# Patient Record
Sex: FEMALE | Race: WHITE | NOT HISPANIC OR LATINO | Employment: OTHER | URBAN - METROPOLITAN AREA
[De-identification: names, ages, dates, MRNs, and addresses within clinical notes are randomized per-mention and may not be internally consistent; named-entity substitution may affect disease eponyms.]

---

## 2017-12-28 PROBLEM — E86.0 DEHYDRATION: Status: ACTIVE | Noted: 2017-12-28

## 2017-12-29 ENCOUNTER — HOSPITAL ENCOUNTER (INPATIENT)
Facility: HOSPITAL | Age: 67
LOS: 5 days | Discharge: HOME | DRG: 871 | End: 2018-01-03
Attending: FAMILY MEDICINE | Admitting: FAMILY MEDICINE
Payer: MEDICARE

## 2017-12-29 DIAGNOSIS — J44.9 CHRONIC OBSTRUCTIVE PULMONARY DISEASE, UNSPECIFIED COPD TYPE: ICD-10-CM

## 2017-12-29 DIAGNOSIS — N17.9 ACUTE RENAL FAILURE, UNSPECIFIED ACUTE RENAL FAILURE TYPE: Primary | ICD-10-CM

## 2017-12-29 DIAGNOSIS — A04.5 CAMPYLOBACTER DIARRHEA: ICD-10-CM

## 2017-12-29 DIAGNOSIS — I95.9 HYPOTENSION: ICD-10-CM

## 2017-12-29 DIAGNOSIS — E11.9 TYPE 2 DIABETES MELLITUS WITHOUT COMPLICATION, UNSPECIFIED LONG TERM INSULIN USE STATUS: ICD-10-CM

## 2017-12-29 DIAGNOSIS — R41.82 ALTERED MENTAL STATUS, UNSPECIFIED ALTERED MENTAL STATUS TYPE: ICD-10-CM

## 2017-12-29 DIAGNOSIS — R41.82 ALTERED MENTAL STATUS: ICD-10-CM

## 2017-12-29 DIAGNOSIS — D69.6 THROMBOCYTOPENIA: ICD-10-CM

## 2017-12-29 DIAGNOSIS — E87.20 METABOLIC ACIDOSIS: ICD-10-CM

## 2017-12-29 DIAGNOSIS — R65.21 SEPTIC SHOCK: ICD-10-CM

## 2017-12-29 DIAGNOSIS — A41.9 SEPSIS, DUE TO UNSPECIFIED ORGANISM: ICD-10-CM

## 2017-12-29 DIAGNOSIS — A41.9 SEPTIC SHOCK: ICD-10-CM

## 2017-12-29 DIAGNOSIS — N17.9 ARF (ACUTE RENAL FAILURE): ICD-10-CM

## 2017-12-29 DIAGNOSIS — I95.89 OTHER SPECIFIED HYPOTENSION: ICD-10-CM

## 2017-12-29 DIAGNOSIS — E86.0 DEHYDRATION: ICD-10-CM

## 2017-12-29 DIAGNOSIS — E78.49 OTHER HYPERLIPIDEMIA: ICD-10-CM

## 2017-12-29 PROBLEM — E78.5 HLD (HYPERLIPIDEMIA): Status: ACTIVE | Noted: 2017-12-29

## 2017-12-29 PROBLEM — I10 HTN (HYPERTENSION): Status: ACTIVE | Noted: 2017-12-29

## 2017-12-29 PROBLEM — I25.10 CAD (CORONARY ARTERY DISEASE): Status: ACTIVE | Noted: 2017-12-29

## 2017-12-29 LAB
ALBUMIN SERPL BCP-MCNC: 2 G/DL
ALBUMIN SERPL BCP-MCNC: 2 G/DL
ALLENS TEST: ABNORMAL
ALLENS TEST: ABNORMAL
ALP SERPL-CCNC: 90 U/L
ALT SERPL W/O P-5'-P-CCNC: 8 U/L
AMORPH CRY URNS QL MICRO: ABNORMAL
ANION GAP SERPL CALC-SCNC: 13 MMOL/L
ANION GAP SERPL CALC-SCNC: 14 MMOL/L
APTT BLDCRRT: 25.2 SEC
AST SERPL-CCNC: 10 U/L
BACTERIA #/AREA URNS HPF: ABNORMAL /HPF
BASOPHILS # BLD AUTO: 0.01 K/UL
BASOPHILS NFR BLD: 0.2 %
BILIRUB SERPL-MCNC: 0.4 MG/DL
BILIRUB UR QL STRIP: NEGATIVE
BUN SERPL-MCNC: 67 MG/DL
BUN SERPL-MCNC: 72 MG/DL
CALCIUM SERPL-MCNC: 6.4 MG/DL
CALCIUM SERPL-MCNC: 6.5 MG/DL
CALCIUM UR-MCNC: <1 MG/DL
CHLORIDE SERPL-SCNC: 109 MMOL/L
CHLORIDE SERPL-SCNC: 110 MMOL/L
CHLORIDE UR-SCNC: 46 MMOL/L
CK SERPL-CCNC: 305 U/L
CLARITY UR: CLEAR
CO2 SERPL-SCNC: 15 MMOL/L
CO2 SERPL-SCNC: 16 MMOL/L
COLOR UR: YELLOW
CREAT SERPL-MCNC: 3.8 MG/DL
CREAT SERPL-MCNC: 4.6 MG/DL
CREAT UR-MCNC: 39.2 MG/DL
CREAT UR-MCNC: 39.2 MG/DL
DELSYS: ABNORMAL
DELSYS: ABNORMAL
DIFFERENTIAL METHOD: ABNORMAL
EOSINOPHIL # BLD AUTO: 0.1 K/UL
EOSINOPHIL NFR BLD: 2 %
EOSINOPHIL URNS QL WRIGHT STN: NORMAL
ERYTHROCYTE [DISTWIDTH] IN BLOOD BY AUTOMATED COUNT: 15.6 %
EST. GFR  (AFRICAN AMERICAN): 10 ML/MIN/1.73 M^2
EST. GFR  (AFRICAN AMERICAN): 13 ML/MIN/1.73 M^2
EST. GFR  (NON AFRICAN AMERICAN): 11 ML/MIN/1.73 M^2
EST. GFR  (NON AFRICAN AMERICAN): 9 ML/MIN/1.73 M^2
ESTIMATED AVG GLUCOSE: 197 MG/DL
ESTIMATED PA SYSTOLIC PRESSURE: 51.03
FLOW: 2
GLUCOSE SERPL-MCNC: 328 MG/DL
GLUCOSE SERPL-MCNC: 607 MG/DL
GLUCOSE UR QL STRIP: ABNORMAL
GRAN CASTS #/AREA URNS LPF: 2 /LPF
HBA1C MFR BLD HPLC: 8.5 %
HCO3 UR-SCNC: 14.6 MMOL/L (ref 24–28)
HCO3 UR-SCNC: 16.2 MMOL/L (ref 24–28)
HCT VFR BLD AUTO: 28.1 %
HGB BLD-MCNC: 8.9 G/DL
HGB UR QL STRIP: ABNORMAL
HYALINE CASTS #/AREA URNS LPF: 2 /LPF
INR PPP: 1.1
KETONES UR QL STRIP: NEGATIVE
LACTATE SERPL-SCNC: 0.7 MMOL/L
LDH SERPL L TO P-CCNC: 207 U/L
LEUKOCYTE ESTERASE UR QL STRIP: NEGATIVE
LYMPHOCYTES # BLD AUTO: 1.2 K/UL
LYMPHOCYTES NFR BLD: 20 %
MAGNESIUM SERPL-MCNC: 1.3 MG/DL
MCH RBC QN AUTO: 31.3 PG
MCHC RBC AUTO-ENTMCNC: 31.7 G/DL
MCV RBC AUTO: 99 FL
MICROSCOPIC COMMENT: ABNORMAL
MODE: ABNORMAL
MONOCYTES # BLD AUTO: 0.6 K/UL
MONOCYTES NFR BLD: 10.5 %
NEUTROPHILS # BLD AUTO: 4.1 K/UL
NEUTROPHILS NFR BLD: 66.8 %
NITRITE UR QL STRIP: NEGATIVE
PATH REV BLD -IMP: NORMAL
PCO2 BLDA: 37.2 MMHG (ref 35–45)
PCO2 BLDA: 38 MMHG (ref 35–45)
PH SMN: 7.19 [PH] (ref 7.35–7.45)
PH SMN: 7.25 [PH] (ref 7.35–7.45)
PH UR STRIP: 5 [PH] (ref 5–8)
PHOSPHATE SERPL-MCNC: 4.3 MG/DL
PHOSPHATE SERPL-MCNC: 4.8 MG/DL
PLATELET # BLD AUTO: 103 K/UL
PMV BLD AUTO: 11.7 FL
PO2 BLDA: 111 MMHG (ref 80–100)
PO2 BLDA: 95 MMHG (ref 80–100)
POC BE: -11 MMOL/L
POC BE: -14 MMOL/L
POC SATURATED O2: 96 % (ref 95–100)
POC SATURATED O2: 97 % (ref 95–100)
POC TCO2: 16 MMOL/L (ref 23–27)
POC TCO2: 17 MMOL/L (ref 23–27)
POCT GLUCOSE: 259 MG/DL (ref 70–110)
POCT GLUCOSE: 357 MG/DL (ref 70–110)
POCT GLUCOSE: 411 MG/DL (ref 70–110)
POCT GLUCOSE: 432 MG/DL (ref 70–110)
POCT GLUCOSE: 448 MG/DL (ref 70–110)
POTASSIUM SERPL-SCNC: 5.1 MMOL/L
POTASSIUM SERPL-SCNC: 5.4 MMOL/L
PROCALCITONIN SERPL IA-MCNC: 1.85 NG/ML
PROT SERPL-MCNC: 6.1 G/DL
PROT UR QL STRIP: ABNORMAL
PROT UR-MCNC: 13 MG/DL
PROT/CREAT RATIO, UR: 0.33
PROTHROMBIN TIME: 11.2 SEC
RBC # BLD AUTO: 2.84 M/UL
RBC #/AREA URNS HPF: 12 /HPF (ref 0–4)
RETIRED EF AND QEF - SEE NOTES: 55 (ref 55–65)
SAMPLE: ABNORMAL
SAMPLE: ABNORMAL
SITE: ABNORMAL
SITE: ABNORMAL
SODIUM SERPL-SCNC: 138 MMOL/L
SODIUM SERPL-SCNC: 139 MMOL/L
SODIUM UR-SCNC: 46 MMOL/L
SP GR UR STRIP: 1.02 (ref 1–1.03)
SP02: 100
SQUAMOUS #/AREA URNS HPF: 5 /HPF
T4 FREE SERPL-MCNC: 0.83 NG/DL
TRICUSPID VALVE REGURGITATION: ABNORMAL
TSH SERPL DL<=0.005 MIU/L-ACNC: 0.25 UIU/ML
URN SPEC COLLECT METH UR: ABNORMAL
UROBILINOGEN UR STRIP-ACNC: NEGATIVE EU/DL
UUN UR-MCNC: 291 MG/DL
VANCOMYCIN SERPL-MCNC: <1.1 UG/ML
WBC # BLD AUTO: 6.09 K/UL
WBC #/AREA URNS HPF: 1 /HPF (ref 0–5)

## 2017-12-29 PROCEDURE — G8996 SWALLOW CURRENT STATUS: HCPCS | Mod: CN

## 2017-12-29 PROCEDURE — 97802 MEDICAL NUTRITION INDIV IN: CPT

## 2017-12-29 PROCEDURE — 63600175 PHARM REV CODE 636 W HCPCS: Performed by: FAMILY MEDICINE

## 2017-12-29 PROCEDURE — 20000000 HC ICU ROOM

## 2017-12-29 PROCEDURE — 83036 HEMOGLOBIN GLYCOSYLATED A1C: CPT

## 2017-12-29 PROCEDURE — 25000003 PHARM REV CODE 250: Performed by: FAMILY MEDICINE

## 2017-12-29 PROCEDURE — 87205 SMEAR GRAM STAIN: CPT

## 2017-12-29 PROCEDURE — 84300 ASSAY OF URINE SODIUM: CPT

## 2017-12-29 PROCEDURE — 80202 ASSAY OF VANCOMYCIN: CPT

## 2017-12-29 PROCEDURE — 36600 WITHDRAWAL OF ARTERIAL BLOOD: CPT

## 2017-12-29 PROCEDURE — 92610 EVALUATE SWALLOWING FUNCTION: CPT

## 2017-12-29 PROCEDURE — 84443 ASSAY THYROID STIM HORMONE: CPT

## 2017-12-29 PROCEDURE — 83605 ASSAY OF LACTIC ACID: CPT

## 2017-12-29 PROCEDURE — 80069 RENAL FUNCTION PANEL: CPT

## 2017-12-29 PROCEDURE — 84165 PROTEIN E-PHORESIS SERUM: CPT

## 2017-12-29 PROCEDURE — 84145 PROCALCITONIN (PCT): CPT

## 2017-12-29 PROCEDURE — 87086 URINE CULTURE/COLONY COUNT: CPT

## 2017-12-29 PROCEDURE — 84166 PROTEIN E-PHORESIS/URINE/CSF: CPT | Mod: 26,,, | Performed by: PATHOLOGY

## 2017-12-29 PROCEDURE — 94761 N-INVAS EAR/PLS OXIMETRY MLT: CPT

## 2017-12-29 PROCEDURE — 4A00X4Z MEASUREMENT OF CENTRAL NERVOUS ELECTRICAL ACTIVITY, EXTERNAL APPROACH: ICD-10-PCS | Performed by: PSYCHIATRY & NEUROLOGY

## 2017-12-29 PROCEDURE — 86334 IMMUNOFIX E-PHORESIS SERUM: CPT

## 2017-12-29 PROCEDURE — 84156 ASSAY OF PROTEIN URINE: CPT

## 2017-12-29 PROCEDURE — 99223 1ST HOSP IP/OBS HIGH 75: CPT | Mod: ,,, | Performed by: INTERNAL MEDICINE

## 2017-12-29 PROCEDURE — 82550 ASSAY OF CK (CPK): CPT

## 2017-12-29 PROCEDURE — 36415 COLL VENOUS BLD VENIPUNCTURE: CPT

## 2017-12-29 PROCEDURE — 83735 ASSAY OF MAGNESIUM: CPT

## 2017-12-29 PROCEDURE — 86334 IMMUNOFIX E-PHORESIS SERUM: CPT | Mod: 26,,, | Performed by: PATHOLOGY

## 2017-12-29 PROCEDURE — 83615 LACTATE (LD) (LDH) ENZYME: CPT

## 2017-12-29 PROCEDURE — 82436 ASSAY OF URINE CHLORIDE: CPT

## 2017-12-29 PROCEDURE — 85025 COMPLETE CBC W/AUTO DIFF WBC: CPT

## 2017-12-29 PROCEDURE — 86038 ANTINUCLEAR ANTIBODIES: CPT

## 2017-12-29 PROCEDURE — 63600175 PHARM REV CODE 636 W HCPCS

## 2017-12-29 PROCEDURE — 95819 EEG AWAKE AND ASLEEP: CPT | Mod: 26,,, | Performed by: PSYCHIATRY & NEUROLOGY

## 2017-12-29 PROCEDURE — 82340 ASSAY OF CALCIUM IN URINE: CPT

## 2017-12-29 PROCEDURE — 93010 ELECTROCARDIOGRAM REPORT: CPT | Mod: S$GLB,,, | Performed by: INTERNAL MEDICINE

## 2017-12-29 PROCEDURE — 27000221 HC OXYGEN, UP TO 24 HOURS

## 2017-12-29 PROCEDURE — 82803 BLOOD GASES ANY COMBINATION: CPT

## 2017-12-29 PROCEDURE — 85730 THROMBOPLASTIN TIME PARTIAL: CPT

## 2017-12-29 PROCEDURE — 84439 ASSAY OF FREE THYROXINE: CPT

## 2017-12-29 PROCEDURE — 81000 URINALYSIS NONAUTO W/SCOPE: CPT

## 2017-12-29 PROCEDURE — 85610 PROTHROMBIN TIME: CPT

## 2017-12-29 PROCEDURE — 84165 PROTEIN E-PHORESIS SERUM: CPT | Mod: 26,,, | Performed by: PATHOLOGY

## 2017-12-29 PROCEDURE — 84100 ASSAY OF PHOSPHORUS: CPT

## 2017-12-29 PROCEDURE — 84540 ASSAY OF URINE/UREA-N: CPT

## 2017-12-29 PROCEDURE — 86255 FLUORESCENT ANTIBODY SCREEN: CPT

## 2017-12-29 PROCEDURE — 93306 TTE W/DOPPLER COMPLETE: CPT

## 2017-12-29 PROCEDURE — G8997 SWALLOW GOAL STATUS: HCPCS | Mod: CL

## 2017-12-29 PROCEDURE — 85060 BLOOD SMEAR INTERPRETATION: CPT | Mod: ,,, | Performed by: PATHOLOGY

## 2017-12-29 PROCEDURE — 95819 EEG AWAKE AND ASLEEP: CPT

## 2017-12-29 PROCEDURE — 80053 COMPREHEN METABOLIC PANEL: CPT

## 2017-12-29 PROCEDURE — 84166 PROTEIN E-PHORESIS/URINE/CSF: CPT

## 2017-12-29 PROCEDURE — 93005 ELECTROCARDIOGRAM TRACING: CPT

## 2017-12-29 PROCEDURE — 87040 BLOOD CULTURE FOR BACTERIA: CPT

## 2017-12-29 RX ORDER — CELECOXIB 100 MG/1
100 CAPSULE ORAL 2 TIMES DAILY
COMMUNITY

## 2017-12-29 RX ORDER — IBUPROFEN 200 MG
24 TABLET ORAL
Status: DISCONTINUED | OUTPATIENT
Start: 2017-12-29 | End: 2018-01-03 | Stop reason: HOSPADM

## 2017-12-29 RX ORDER — MECLIZINE HYDROCHLORIDE CHEWABLE TABLETS 25 MG/1
32 TABLET, CHEWABLE ORAL 3 TIMES DAILY PRN
COMMUNITY

## 2017-12-29 RX ORDER — NOREPINEPHRINE BITARTRATE/D5W 16MG/250ML
0.02 PLASTIC BAG, INJECTION (ML) INTRAVENOUS CONTINUOUS
Status: DISCONTINUED | OUTPATIENT
Start: 2017-12-29 | End: 2017-12-31

## 2017-12-29 RX ORDER — ALPRAZOLAM 1 MG/1
1 TABLET ORAL 2 TIMES DAILY PRN
COMMUNITY

## 2017-12-29 RX ORDER — GLUCAGON 1 MG
1 KIT INJECTION
Status: DISCONTINUED | OUTPATIENT
Start: 2017-12-29 | End: 2017-12-29

## 2017-12-29 RX ORDER — GLUCAGON 1 MG
1 KIT INJECTION
Status: DISCONTINUED | OUTPATIENT
Start: 2017-12-29 | End: 2018-01-03 | Stop reason: HOSPADM

## 2017-12-29 RX ORDER — ATORVASTATIN CALCIUM 20 MG/1
20 TABLET, FILM COATED ORAL DAILY
COMMUNITY

## 2017-12-29 RX ORDER — SODIUM CHLORIDE 0.9 % (FLUSH) 0.9 %
5 SYRINGE (ML) INJECTION
Status: DISCONTINUED | OUTPATIENT
Start: 2017-12-29 | End: 2018-01-03 | Stop reason: HOSPADM

## 2017-12-29 RX ORDER — HYDROGEN PEROXIDE 3 %
SOLUTION, NON-ORAL MISCELLANEOUS
COMMUNITY

## 2017-12-29 RX ORDER — NEBIVOLOL 2.5 MG/1
TABLET ORAL DAILY
COMMUNITY

## 2017-12-29 RX ORDER — POTASSIUM CHLORIDE 750 MG/1
TABLET, EXTENDED RELEASE ORAL
COMMUNITY

## 2017-12-29 RX ORDER — INSULIN ASPART 100 [IU]/ML
10 INJECTION, SOLUTION INTRAVENOUS; SUBCUTANEOUS ONCE
Status: COMPLETED | OUTPATIENT
Start: 2017-12-29 | End: 2017-12-29

## 2017-12-29 RX ORDER — INSULIN ASPART 100 [IU]/ML
1-10 INJECTION, SOLUTION INTRAVENOUS; SUBCUTANEOUS EVERY 4 HOURS PRN
Status: DISCONTINUED | OUTPATIENT
Start: 2017-12-29 | End: 2017-12-31

## 2017-12-29 RX ORDER — FUROSEMIDE 20 MG/1
TABLET ORAL
COMMUNITY

## 2017-12-29 RX ORDER — SODIUM CHLORIDE 9 MG/ML
INJECTION, SOLUTION INTRAVENOUS CONTINUOUS
Status: DISCONTINUED | OUTPATIENT
Start: 2017-12-29 | End: 2017-12-29

## 2017-12-29 RX ORDER — INSULIN ASPART 100 [IU]/ML
0-5 INJECTION, SOLUTION INTRAVENOUS; SUBCUTANEOUS
Status: DISCONTINUED | OUTPATIENT
Start: 2017-12-29 | End: 2017-12-29

## 2017-12-29 RX ORDER — DEXAMETHASONE 1 MG/1
TABLET ORAL WEEKLY
COMMUNITY

## 2017-12-29 RX ORDER — ASPIRIN 325 MG
325 TABLET ORAL DAILY
COMMUNITY

## 2017-12-29 RX ORDER — LISINOPRIL 10 MG/1
TABLET ORAL DAILY
COMMUNITY

## 2017-12-29 RX ORDER — VALACYCLOVIR HYDROCHLORIDE 500 MG/1
TABLET, FILM COATED ORAL 2 TIMES DAILY
COMMUNITY

## 2017-12-29 RX ORDER — IBUPROFEN 200 MG
16 TABLET ORAL
Status: DISCONTINUED | OUTPATIENT
Start: 2017-12-29 | End: 2018-01-03 | Stop reason: HOSPADM

## 2017-12-29 RX ORDER — VENLAFAXINE HYDROCHLORIDE 150 MG/1
75 CAPSULE, EXTENDED RELEASE ORAL DAILY
COMMUNITY

## 2017-12-29 RX ORDER — GABAPENTIN 100 MG/1
CAPSULE ORAL
COMMUNITY

## 2017-12-29 RX ORDER — INSULIN ASPART 100 [IU]/ML
1-10 INJECTION, SOLUTION INTRAVENOUS; SUBCUTANEOUS EVERY 6 HOURS PRN
Status: DISCONTINUED | OUTPATIENT
Start: 2017-12-29 | End: 2017-12-29

## 2017-12-29 RX ORDER — CIPROFLOXACIN 2 MG/ML
400 INJECTION, SOLUTION INTRAVENOUS
Status: DISCONTINUED | OUTPATIENT
Start: 2017-12-29 | End: 2017-12-29

## 2017-12-29 RX ORDER — AZITHROMYCIN 250 MG/1
500 TABLET, FILM COATED ORAL DAILY
Status: DISCONTINUED | OUTPATIENT
Start: 2017-12-29 | End: 2017-12-29

## 2017-12-29 RX ORDER — CIPROFLOXACIN 2 MG/ML
400 INJECTION, SOLUTION INTRAVENOUS
Status: DISCONTINUED | OUTPATIENT
Start: 2017-12-29 | End: 2018-01-01

## 2017-12-29 RX ORDER — FERROUS GLUCONATE 324(38)MG
TABLET ORAL
COMMUNITY

## 2017-12-29 RX ADMIN — INSULIN ASPART 5 UNITS: 100 INJECTION, SOLUTION INTRAVENOUS; SUBCUTANEOUS at 05:12

## 2017-12-29 RX ADMIN — DEXTROSE MONOHYDRATE: 5 INJECTION, SOLUTION INTRAVENOUS at 08:12

## 2017-12-29 RX ADMIN — INSULIN ASPART 10 UNITS: 100 INJECTION, SOLUTION INTRAVENOUS; SUBCUTANEOUS at 08:12

## 2017-12-29 RX ADMIN — CIPROFLOXACIN 400 MG: 2 INJECTION, SOLUTION INTRAVENOUS at 10:12

## 2017-12-29 RX ADMIN — Medication 0.15 MCG/KG/MIN: at 10:12

## 2017-12-29 RX ADMIN — SODIUM CHLORIDE 1000 ML: 0.9 INJECTION, SOLUTION INTRAVENOUS at 02:12

## 2017-12-29 RX ADMIN — CIPROFLOXACIN 400 MG: 2 INJECTION, SOLUTION INTRAVENOUS at 11:12

## 2017-12-29 RX ADMIN — Medication 0.16 MCG/KG/MIN: at 08:12

## 2017-12-29 RX ADMIN — AZITHROMYCIN 500 MG: 250 TABLET, FILM COATED ORAL at 11:12

## 2017-12-29 RX ADMIN — Medication 0.15 MCG/KG/MIN: at 02:12

## 2017-12-29 RX ADMIN — DEXTROSE MONOHYDRATE: 5 INJECTION, SOLUTION INTRAVENOUS at 10:12

## 2017-12-29 RX ADMIN — INSULIN ASPART 10 UNITS: 100 INJECTION, SOLUTION INTRAVENOUS; SUBCUTANEOUS at 09:12

## 2017-12-29 RX ADMIN — VANCOMYCIN HYDROCHLORIDE 1500 MG: 1 INJECTION, POWDER, LYOPHILIZED, FOR SOLUTION INTRAVENOUS at 04:12

## 2017-12-29 RX ADMIN — CEFEPIME 1 G: 1 INJECTION, POWDER, FOR SOLUTION INTRAMUSCULAR; INTRAVENOUS at 01:12

## 2017-12-29 RX ADMIN — SODIUM CHLORIDE: 0.9 INJECTION, SOLUTION INTRAVENOUS at 02:12

## 2017-12-29 NOTE — PROGRESS NOTES
"Vancomycin Dosing and Monitoring Pharmacy Protocol    Sanam Bocanegra is a 67 y.o. female    Height: 5' 7" (1.702 m)   Wt Readings from Last 1 Encounters:   17 97.5 kg (214 lb 15.2 oz)     Ideal body weight: 61.6 kg (135 lb 12.9 oz)  Adjusted ideal body weight: 76 kg (167 lb 7.4 oz)    Temp Readings from Last 3 Encounters:   17 98 °F (36.7 °C) (Oral)      Lab Results   Component Value Date/Time    WBC 6.09 2017 03:36 AM      Lab Results   Component Value Date/Time    CREATININE 4.6 (H) 2017 03:36 AM        Serum creatinine: 4.6 mg/dL High 17 0336  Estimated creatinine clearance: 13.8 mL/min    Antibiotics       Start     Stop Route Frequency Ordered    17 0430  vancomycin (VANCOCIN) 1,500 mg in dextrose 5 % 250 mL IVPB  (Vancomycin IVPB with levels panel)      -- IV Once 17 0320          Antifungals       None            Microbiology Results (last 7 days)       Procedure Component Value Units Date/Time    Blood culture [[id number]] Collected:  17    Order Status:  Sent Specimen:  Blood Updated:  17    Urine culture [[id number]] Collected:  17 023    Order Status:  Sent Specimen:  Urine from Urine, Catheterized Updated:  17    Blood culture [[id number]]     Order Status:  Sent Specimen:  Blood             Indication:   Bacteremia    Target Level: 15-20 mcg/ml    Hemodialysis:   No on N/A    Dosing Weight:   AdjBW--adjusted body weight  If ABW is greater than or equal to 30% over Ideal Body Weight, AdjBW will be used to calculate vancomycin dose.    Last Vancomycin dose: N/A   Date/Time given: N/A         Vancomycin level:  No results for input(s): VANCOMYCIN-TROUGH in the last 72 hours.  Recent Labs   Lab Result Units  17   0336   Vancomycin, Random ug/mL  <1.1       Per Protocol Initial/Adjustments Dosin. Initial/Adjustment Dose: Vancomycin dosage adjusted from 1500 mg q12h to 1500 mg X1 dose, the pulse dosing based on the " results of random vancomycin levels.   2. Vancomycin Random Level will be drawn on 12/30/07 at 0500 date/time    Pharmacy will continue to follow.    Please contact if you have any further questions. Thank you.    Ivan Esparza, PharmD  [phone]

## 2017-12-29 NOTE — CONSULTS
"  [facility]  Adult Nutrition  Consult Note    SUMMARY     Recommendations    Recommendation/Intervention:   1. When medically appropriate, consult ST for swallow eval.   2.  If unable to start diet, consider TF: Suplena at 10ml/hr and advance as toelrated to goal rate of 45ml/hr to provide 1944 kcal, 49g protein, & 797ml free water.    Goals:   Diet or TF will be started within 48 hours  Nutrition Goal Status: new  Communication of RD Recs: reviewed with RN    Continuum of Care Plan  Referral to Outpatient Services:  (d/c diet to be determined)    Reason for Assessment  Reason for Assessment: nurse/nurse practitioner consult (NPO)  Diagnosis:  (ARF)  Relevent Medical History: anxiety, HTN, HLD, CAD, COPD, DM      General Information Comments: Pt currently NPO. Pt with chronic diarrhea at home. Per RN, pt not alert enough for po diet.    Nutrition Prescription Ordered  Current Diet Order: NPO     Evaluation of Received Nutrients/Fluid Intake  % Intake of Estimated Energy Needs: Other: NPO  % Meal Intake: NPO     Nutrition/Diet History  Food Preferences: no Roman [employer] or cultural food prefs identified  Factors Affecting Nutritional Intake: diarrhea, NPO     Labs/Tests/Procedures/Meds  Pertinent Labs Reviewed: reviewed  Pertinent Labs Comments: BUN 72H, Crea 4.6H, Glu 328H, Ca 6.4L, Mg 1.3L, Alb 2.0L  Pertinent Medications Reviewed: reviewed     Physical Findings  Overall Physical Appearance: weak  Tubes:  (none)  Oral/Mouth Cavity:  (unable to assess)  Skin:  (Sam 12-DTI to butocks & L heel)    Anthropometrics  Temp: 98.6 °F (37 °C)  Height: 5' 7" (170.2 cm)  Weight Method: Bed Scale  Weight: 97.5 kg (214 lb 15.2 oz)  Ideal Body Weight (IBW), Female: 135 lb  % Ideal Body Weight, Female (lb): 159.22 lb  BMI (Calculated): 33.7  BMI Grade: 30 - 34.9- obesity - grade I     Estimated/Assessed Needs  Weight Used For Calorie Calculations: 61.3 kg (135 lb 2.3 oz) (IBW)   Energy Calorie Requirements " (kcal): 8094-2587  Energy Need Method: Kcal/kg (30-35 kcal/kg IBW)  RMR (Ashe-St. Jeor Equation): 1170.62  Weight Used For Protein Calculations: 61.3 kg (135 lb 2.3 oz)  Protein Requirements: 61g (1.0g/kg)  Fluid Need Method: RDA Method  RDA Method (mL): 1839     Assessment and Plan  Nutrition Problem  Inadequate energy intake    Related to (etiology):   dx    Signs and Symptoms (as evidenced by):   NPO    Interventions/Recommendations (treatment strategy):  See recs    Nutrition Diagnosis Status:   New    Monitor and Evaluation  Food and Nutrient Intake: food and beverage intake  Food and Nutrient Adminstration: diet order  Physical Activity and Function: nutrition-related ADLs and IADLs  Anthropometric Measurements: weight  Biochemical Data, Medical Tests and Procedures: electrolyte and renal panel  Nutrition-Focused Physical Findings: overall appearance    Nutrition Risk  Level of Risk:  (2xweekly)    Nutrition Follow-Up  RD Follow-up?: Yes

## 2017-12-29 NOTE — PLAN OF CARE
Pt disoriented to place, situation, time. Information obtained from , Hipolito Bocanegra, who was bedside.     History of Present Illness:  66 y/o white female with PMH of HTN, HLD, CAD, COPD (home O2 2L PRN), DM2, anxiety with depression presented to outside hospital for AMS x 1 day. Per review of outside paper notes the daughter-in-law noticed she's been altered since yesterday and has progressively worsened. Patient reports that she has chronic diarrhea for many years but it worsened recently. She does state that she has recently seen blood in her stool. She denies sick contacts or others at home with similar symptoms. She is able to answer questions when repeatedly prompted, however she remains altered and oriented only to self.     Pt used RW to get around before Jaimee Day. She was diagnosed with Cancer in 2016 and is on chemo therapy at home. Pt also has WC, O2 and glucometer    Pt lives with supportive  who has been caring for her and will provide transportation on discharge       12/29/17 1121   Discharge Assessment   Assessment Type Discharge Planning Assessment   Confirmed/corrected address and phone number on facesheet? Yes   Assessment information obtained from? Caregiver  (: Hipolito Bocanegra  [phone])   Expected Length of Stay (days) (TBD)   Communicated expected length of stay with patient/caregiver no   Prior to hospitilization cognitive status: Alert/Oriented   Prior to hospitalization functional status: Assistive Equipment  (was able to get around with RW untill Jaimee Day)   Current cognitive status: Not Oriented to Place;Not Oriented to Time   Current Functional Status: Needs Assistance;Completely Dependent   Facility Arrived From: pt transferred from [facility]   Lives With spouse  (: Hipolito Bocanegra  [phone])   Able to Return to Prior Arrangements unable to determine at this time (comments)   Is patient able to care for self after discharge? Unable to determine  at this time (comments)   Who are your caregiver(s) and their phone number(s)? : Hipolito Bocanegra  [phone]   Readmission Within The Last 30 Days no previous admission in last 30 days   Patient currently being followed by outpatient case management? Unable to determine (comments)   Patient currently receives any other outside agency services? No   Equipment Currently Used at Home wheelchair;walker, rolling;oxygen;glucometer   Do you have any problems affording any of your prescribed medications? No   Is the patient taking medications as prescribed? yes   Does the patient have transportation home? Yes   Transportation Available family or friend will provide   Dialysis Name and Scheduled days N/A   Does the patient receive services at the [facility]? No   Discharge Plan A Home Health   Discharge Plan B Skilled Nursing Facility   Patient/Family In Agreement With Plan yes     Akila James RN Transitional Navigator  [phone]

## 2017-12-29 NOTE — PROGRESS NOTES
[facility] Hospital Medicine  Progress Note    Patient Name: Sanam Bocanegra  MRN: [id number]  Patient Class: IP- Inpatient   Admission Date: 12/29/2017  Length of Stay: 0 days  Attending Physician: Roc Luz MD  Primary Care Provider: No primary care provider on file.        Subjective:     Principal Problem:ARF (acute renal failure)    HPI:  68 y/o white female with PMH of HTN, HLD, CAD, COPD (home O2 2L PRN), DM2, anxiety with depression presented to outside hospital for AMS x 1 day. Per review of outside paper notes the daughter-in-law noticed she's been altered since yesterday and has progressively worsened. Patient reports that she has chronic diarrhea for many years but it worsened recently. She does state that she has recently seen blood in her stool. She denies sick contacts or others at home with similar symptoms. She is able to answer questions when repeatedly prompted, however she remains altered and oriented only to self.     Hospital Course:  12/29: Upon further review in the AM after admit,  mentions pt has a history of leukemia.  He is unsure of further details.  She receives her care often at [facility].      Contact [facility] & having nursing supervisor fax over medical records to ICU. Talked to Nephro and having urine labs ordered stat and Renal Ultrasound stat to evaluate for cause of NARGIS.  Considering emergent dialysis    Interval History: BP improved, still on levo (0.1 mg).  Pt awakes to sound and follows commands.  Alert to name and birthday, but unsure of year, situation, location, medical problems.  Denies pain, SOB, chest pain.    Review of Systems   Respiratory: Negative for shortness of breath.    Cardiovascular: Negative for chest pain.   Gastrointestinal: Negative for abdominal pain.   Neurological: Positive for tremors.        Difficulty in performing tasks even when she understands the command (raising arms)   Psychiatric/Behavioral: Positive for confusion.       Limited by mental status    Objective:     Vital Signs (Most Recent):  Temp: 98.6 °F (37 °C) (12/29/17 0841)  Pulse: 82 (12/29/17 0911)  Resp: 17 (12/29/17 0911)  BP: (!) 139/105 (12/29/17 0911)  SpO2: 99 % (12/29/17 0911) Vital Signs (24h Range):  Temp:  [97.8 °F (36.6 °C)-98.9 °F (37.2 °C)] 98.6 °F (37 °C)  Pulse:  [] 82  Resp:  [12-29] 17  SpO2:  [94 %-100 %] 99 %  BP: ()/() 139/105     Weight: 97.5 kg (214 lb 15.2 oz)  Body mass index is 33.67 kg/m².    Intake/Output Summary (Last 24 hours) at 12/29/17 0933  Last data filed at 12/29/17 0600   Gross per 24 hour   Intake          1923.45 ml   Output             1360 ml   Net           563.45 ml      Physical Exam   Constitutional: She appears well-developed. She appears distressed.   Obese, minimally able to participate in exam   HENT:   Head: Normocephalic and atraumatic.   Nose: Nose normal.   Cardiovascular: Normal rate and regular rhythm.    No murmur heard.  PVCs on monitor, distant heart sounds (significant adipose)   Pulmonary/Chest: Effort normal and breath sounds normal. No respiratory distress.   On 2 L NC, clear anteriorly   Abdominal: Soft. Bowel sounds are normal. She exhibits no distension and no mass. There is tenderness. There is no rebound and no guarding.   Midline scar healing   Musculoskeletal:   Able to move toes, did not move legs when asked; essential tremor   Neurological: She is alert.   oriented   Skin: Skin is warm and dry. Capillary refill takes less than 2 seconds. No erythema. There is pallor.   Psychiatric:   Altered, minimally responsive, confused   Nursing note and vitals reviewed.    Significant Labs:   Blood Culture: No results for input(s): LABBLOO in the last 48 hours.  BMP:   Recent Labs  Lab 12/29/17  0336   *      K 5.1      CO2 15*   BUN 72*   CREATININE 4.6*   CALCIUM 6.4*   MG 1.3*     CBC:   Recent Labs  Lab 12/29/17  0336   WBC 6.09   HGB 8.9*   HCT 28.1*   *     Lactic  Acid:   Recent Labs  Lab 12/29/17  0334   LACTATE 0.7     Magnesium:   Recent Labs  Lab 12/29/17 0336   MG 1.3*     Significant Imaging: I have reviewed all pertinent imaging results/findings within the past 24 hours.         Assessment/Plan:      No notes have been filed under this hospital service.  Service: Hospital Medicine    VTE Risk Mitigation         Ordered     Medium Risk of VTE  Once      12/29/17 0226     Place sequential compression device  Until discontinued      12/29/17 0226        66 y/o white female with PMH of leukemia, HTN, HLD, CAD, COPD (home O2 2L PRN), DM2, anxiety with depression presented to outside hospital for AMS x 1 day. Admitted for AMS and ARF.       Plan: In ICU     AMS  Likely 2/2 uremic encephalopathy 2/2 severe dehydration 2/2 diarrhea   CT head neg for acute process, only showing chronic right mastoiditis  WBC normal 8.4, however per chart review pt has chronic pancytopenia (likely due to unclear diagnosis of leukemia)  Afebrile, LA 1.9, repeat 0.7 (normal)  BUN/Cr 87/6.7, baseline is 20/0.8-  Getting urine studies  STAT [facility] consult, Talked to fellow, appreciate recs, will follow, possibly need stat access  STAT ABG showing significant metabolic acidosis; pH 7.19, pCO2 38, pO2 111, BE -14, HCO3 14.6, SaO2 97%  Started D5W w/ 3 amps bicarb  Obtaining speech study to evaluate for PO abx     ARF  BUN/Cr 87/6.7, baseline is 20/0.8  STAT Nephro consult, spoke with Dr. Friedman last night and fellow on-call today  F/u urine labs, renal US  May need emergent HD  S/p 4L plus fluids at 150 cc/hr     Septic Shock with Hx of leukemia  Concern for sepsis with a confirmed source (Campylobacter), cannot trust lack of fever or WBC # because of hx of leukemia  Hypotensive at time of presentation and on arrival to [place] -improved on levophed  S/p 3L NS PTA. Gave another 1L NS bolus and then starting IVF's at 200 cc/hr  Arrived on Levofed, will continue and titrate PRN for MAP >65  Already  received Rocephin and Levaquin  Will Broad spectrum abx  UA concerning for UTI at OSH, but improved here (clear yellow, no signs of UTI)  BCx's and UC'x obtained at outside hospital    Campylobacter diarrhea  Stool positive for Campylobacter at OSH  Already received Levaquin  Will start Azithromycin  Stool studies with ova, parasites     Anemia  Likely 2/2 leukemia, obtaining records from OSH  Monitor     Diet: NPO   Ppx: SCD's     Dispo: Monitor BP, ECHO for fluid status, F/u nephro recs    Critical care time spent on the evaluation and treatment of severe organ dysfunction, review of pertinent labs and imaging studies, discussions with consulting providers and discussions with patient/family: 30 minutes.        Reid Schaefer MD  Department of Hospital Medicine   [facility]

## 2017-12-29 NOTE — ASSESSMENT & PLAN NOTE
Her thrombocytopenia is secondary to underlying myeloma and ongoing Campylobacter infection.    LDH WNL. Clinical picture not consistent with TTP.     D/w  (nephrology).    Continue supportive care and dialysis if needed - Currently she has excellent urine output.    At this time, no further work-up planned from heme stand-point.

## 2017-12-29 NOTE — PLAN OF CARE
Problem: Patient Care Overview  Goal: Plan of Care Review  Outcome: Ongoing (interventions implemented as appropriate)    Recommendation/Intervention:   1. When medically appropriate, consult ST for swallow eval.   2.  If unable to start diet, consider TF: Suplena at 10ml/hr and advance as toelrated to goal rate of 45ml/hr to provide 1944 kcal, 49g protein, & 797ml free water.    Goals:   Diet or TF will be started within 48 hours  Nutrition Goal Status: new  Communication of RD Recs: reviewed with RN

## 2017-12-29 NOTE — CONSULTS
Consult Note  [facility]    Consult Requested By: Roc Luz MD    Reason for Consult: NARGIS and metabolic acidosis  Poor historian  SUBJECTIVE:     History of Present Illness:  Patient is a 67 y.o. female with PMHx of HTN, Dyslipidemia, CAD, history of leukemia, COPD on home O2, DM 2, anxiety, depression and obesity presents to OSH with alter mental status, diarrhea and NARGIS. She had campylobacter diarrhea treated with levaquin. She developed stage 3 NARGIS and metabolic acidosis. She is anemic and has thrombocytopenia. She is currently getting EEG to rule out seizure.    Past medical history  1- HTN  2- obesity  3- HLD  4- COPD  5- ? leukemia    Social History   Substance Use Topics    Smoking status: Not on file    Smokeless tobacco: Not on file    Alcohol use Not on file       Review of patient's allergies indicates:   Allergen Reactions    Penicillins         Review of Systems:  Unable to obtain    OBJECTIVE:     Vital Signs (Most Recent)  Temp: 97.9 °F (36.6 °C) (12/29/17 1148)  Pulse: 65 (12/29/17 1515)  Resp: 14 (12/29/17 1515)  BP: (!) 119/59 (12/29/17 1515)  SpO2: 99 % (12/29/17 1515)    Vital Signs Range (Last 24H):  Temp:  [97.8 °F (36.6 °C)-98.9 °F (37.2 °C)]   Pulse:  []   Resp:  [12-29]   BP: ()/()   SpO2:  [94 %-100 %]     Physical Exam:  Poorly groomed   NAD  No JVD  No murmurs or gallops  Lungs decrease soft +BS  Soft BS + tenderness on palpation  Ext: no edema + petechia     Laboratory:  CBC:   Recent Labs  Lab 12/29/17 0336   WBC 6.09   RBC 2.84*   HGB 8.9*   HCT 28.1*   *   MCV 99*   MCH 31.3*   MCHC 31.7*     BMP:   Recent Labs  Lab 12/29/17 0336   *      K 5.1      CO2 15*   BUN 72*   CREATININE 4.6*   CALCIUM 6.4*   MG 1.3*     CMP:   Recent Labs  Lab 12/29/17 0336   *   CALCIUM 6.4*   ALBUMIN 2.0*   PROT 6.1      K 5.1   CO2 15*      BUN 72*   CREATININE 4.6*   ALKPHOS 90   ALT 8*   AST 10   BILITOT 0.4     LFTs:    Recent Labs  Lab 12/29/17  0336   ALT 8*   AST 10   ALKPHOS 90   BILITOT 0.4   PROT 6.1   ALBUMIN 2.0*     Coagulation:   Recent Labs  Lab 12/29/17  1003   LABPROT 11.2   INR 1.1   APTT 25.2     ABGs:   Recent Labs  Lab 12/29/17  0302   PH 7.193*   PCO2 38.0   PO2 111*   HCO3 14.6*   POCSATURATED 97   BE -14     Microbiology Results (last 7 days)     Procedure Component Value Units Date/Time    Blood culture [[id number]] Collected:  12/29/17 0438    Order Status:  Completed Specimen:  Blood Updated:  12/29/17 1515     Blood Culture, Routine No Growth to date    Narrative:       Site # 1    Blood culture [[id number]] Collected:  12/29/17 0453    Order Status:  Completed Specimen:  Blood Updated:  12/29/17 1515     Blood Culture, Routine No Growth to date    Narrative:       Site #2    Clostridium difficile EIA [[id number]]     Order Status:  No result Specimen:  Stool from Stool     Stool culture [[id number]]     Order Status:  No result Specimen:  Stool from Stool     Urine culture [[id number]] Collected:  12/29/17 0239    Order Status:  Sent Specimen:  Urine from Urine, Catheterized Updated:  12/29/17 0431        Specimen (12h ago through future)    None          Recent Labs  Lab 12/29/17 0239   COLORU Yellow   SPECGRAV 1.020   PHUR 5.0   PROTEINUA Trace*   BACTERIA Occasional   NITRITE Negative   LEUKOCYTESUR Negative   UROBILINOGEN Negative   HYALINECASTS 2*     Renal U/S was reviewed    ASSESSMENT/PLAN:     A 66 y/o female with PMHx of HTN, HLD, COPD on oxygen, obesity, DM presents to OSH with AMS/encephalopathy and diarrhea. She was diagnosed with stage 3 NARGIS, anemia, thrombocytopenia and campylobacter diarrhea.      1- stage 3 NARGIS etiology is unclear but differential diagnosis should include   -- TTP/HUS secondary to campylobacter colitis  -- ATN d/t hypotension  -- AIN drug induced Levaquin vs infection     2- severe metabolic acidosis now on bicarb infusion    3- encephalopathy etiology is unclear.     Plan:      -- continue with bicarb infusion 3 amps of sodium bicarb at 100 ml/hr    -- monitor UOP     -- repeat RFP and ABG    -- low threshold to initiate RRT    -- check peripheral smear for schistocytes and get ADAMTS-13 assay stat    -- consider hematology consult if + peripheral smear for schistocytes then patient would need plasma exchange    -- continue with levophed infusion to maintain a MAP > 65

## 2017-12-29 NOTE — H&P
History & Physical  [facility]      SUBJECTIVE:     History of Present Illness:  68 y/o white female with PMH of HTN, HLD, CAD, COPD (home O2 2L PRN), DM2, anxiety with depression presented to outside hospital for AMS x 1 day. Per review of outside paper notes the daughter-in-law noticed she's been altered since yesterday and has progressively worsened. Patient reports that she has chronic diarrhea for many years but it worsened recently. She does state that she has recently seen blood in her stool. She denies sick contacts or others at home with similar symptoms. She is able to answer questions when repeatedly prompted, however she remains altered and oriented only to self.                 Review of patient's allergies indicates:   Allergen Reactions    Penicillins        No past medical history on file.  No past surgical history on file.  No family history on file.  Social History   Substance Use Topics    Smoking status: Not on file    Smokeless tobacco: Not on file    Alcohol use Not on file        Review of Systems:  Unable to assess 2/2 AMS    OBJECTIVE:     Vital Signs (Most Recent)  Temp: 98.9 °F (37.2 °C) (12/29/17 0200)  Pulse: 76 (12/29/17 0200)  Resp: 18 (12/29/17 0200)  BP: (!) 100/59 (12/29/17 0200)  SpO2: 99 % (12/29/17 0200)    Physical Exam:  Gen: NAD, arrived on Levofed with PICC line to left arm  HEENT: NC, AT  Chest: CTABL, no rales or wheezing   CVS: RRR, no m/r/g  Abdomen: soft, NT, ND, no masses, +BS, palacio cath in place and draining home urine well  Ext: no edema, pulses 2+   Skin: ro rashes  Neuro: oriented only to person, unable to fully assess CN's 2/2 AMS, sensation intact to light touch, resting tremor to left arm  Psych: unable to assess      LABS  CBC  No results for input(s): WBC, RBC, HGB, HCT, PLT, MCV, MCH, MCHC in the last 168 hours.  BMP  No results for input(s): NA, K, CO2, CL, BUN, CREATININE, GLUCOSE in the last 168 hours.  GLUCOSE   POCT-Glucose  POCT Glucose    Date Value Ref Range Status   12/29/2017 259 (H) 70 - 110 mg/dL Final       No results for input(s): CALCIUM, MG, PHOS in the last 168 hours.  LFT  No results for input(s): PROT, ALBUMIN, BILITOT, AST, ALKPHOS, ALT in the last 168 hours.    GFR   COAGS  No results for input(s): PT, INR, APTT in the last 168 hours.  CE  No results for input(s): TROPONINI, CKTOTAL, CKMB in the last 168 hours.  ABGs  No results for input(s): PH, PCO2, PO2, HCO3, POCSATURATED, BE in the last 24 hours.  BNP  No results for input(s): BNP in the last 168 hours.  UA  No results for input(s): COLORU, CLARITYU, SPECGRAV, PHUR, PROTEINUA, GLUCOSEU, BLOODU, WBCU, RBCU, BACTERIA, MUCUS in the last 24 hours.    Invalid input(s):  BILIRUBINCON  LAST HbA1c  No results found for: HGBA1C          Diagnostic Results:    Imaging Results    None         ASSESSMENT/PLAN:   68 y/o white female with PMH of HTN, HLD, CAD, COPD (home O2 2L PRN), DM2, anxiety with depression presented to outside hospital for AMS x 1 day. Admitted for AMS and ARF.    Plan: Admit to ICU    AMS  Likely 2/2 uremic encephalopathy 2/2 severe dehydration 2/2 diarrhea   CT head neg for acute process, only showing chronic right mastoiditis  WBC normal 8.4, however per chart review pt has chronic pancytopenia  Afebrile, LA 1.9, will repeat on admit and trend if elevated  BUN/Cr 87/6.7, baseline is 20/0.8  STAT [facility] consult, appreciate recs  STAT ABG showing significant metabolic acidosis; pH 7.19, pCO2 38, pO2 111, BE -14, HCO3 14.6, SaO2 97%  STAT BMP pending; advised to call Nephro fellow back with BMP results for possible HD    ARF  BUN/Cr 87/6.7, baseline is 20/0.8  STAT Nephro consult, spoke with Dr. Friedman  Advised to get ABG and BMP and call back with results  May need emergent HD  S/p 3L at outside hospital; continuing IVF's    Sepsis  Concern for sepsis with a confirmed source (Campylobacter)  Hypotensive at time of presentation and on arrival to [place]  S/p 3L NS PTA.  Gave another 1L NS bolus and then starting IVF's at 200 cc/hr  Arrived on Levofed, will continue and titrate PRN for MAP >65  Already received Rocephin and Levaquin  Will add Vanc to broaden out and cover gram positives and MRSA  UA with 1+ leuks, concentrated at 1.030, home color; suspicious for UTI  BCx's and UC'x obtained at outside hospital  Repeat LA pending    Campylobacter diarrhea  Stool positive for Campylobacter at OSH  Already received Levaquin  Will start Azithromycin at next dose (q24H) 2/2 rising flouroquinolone resistance     Anemia  Likely 2/2 Campy infection   H&H 10.3/33.1 at OSH,   CBC on admit H&H 8.9/28; likely 2/2 aggressive IVF repletion  Monitor    Diet: NPO   Ppx: SCD's    Dispo: Monitor BP, F/u STAT labs and Nephro recs.     12/29/2017 Gordon Schroeder M.D.

## 2017-12-29 NOTE — PROCEDURES
DATE OF PROCEDURE:  12/29/2017  EEG #:  OK-.     REFERRING PHYSICIAN:  Dr. Schroeder.  This EEG was performed to assess for subclinical seizures..  ELECTROENCEPHALOGRAM REPORT  METHODOLOGY:  Electroencephalographic (EEG) recording is recorded with   electrodes placed according to the International 10-20 placement system.  Thirty   two (32) channels of digital signal (sampling rate of 512/sec), including T1   and T2, were simultaneously recorded from the scalp and may include EKG, EMG,   and/or eye monitors.  Recording band pass was 0.1 to 512 Hz.  Digital video   recording of the patient is simultaneously recorded with the EEG.  The patient   is instructed to report clinical symptoms which may occur during the recording   session.  EEG and video recording are stored and archived in digital format.    Activation procedures, which include photic stimulation, hyperventilation and   instructing patients to perform simple tasks, are done in selected patients  The EEG is displayed on a monitor screen and can be reviewed using different   montages.  Computer assisted-analysis is employed to detect spike and   electrographic seizure activity.   The entire record is submitted for computer   analysis.  The entire recording is visually reviewed, and the times identified   by computer analysis as being spikes or seizures are reviewed again.    Compressed spectral analysis (CSA) is also performed on the activity recorded   from each individual channel.  This is displayed as a power display of   frequencies from 0 to 30 Hz over time.   The CSA is reviewed looking for   asymmetries in power between homologous areas of the scalp, then compared with   the original EEG recording.    [employer] software was also utilized in the review of this study.  This software   suite analyzes the EEG recording in multiple domains.  Coherence and rhythmicity   are computed to identify EEG sections which may contain organized seizures.    Each  channel undergoes analysis to detect the presence of spike and sharp waves   which have special and morphological characteristics of epileptic activity.  The   routine EEG recording is converted from special into frequency domain.  This is   then displayed comparing homologous areas to identify areas of significant   asymmetry.  Algorithm to identify non-cortically generated artifact is used to   separate artifact from the EEG.    EEG FINDINGS:  The recording was obtained with a number of standard bipolar and   referential montages during obtunded state.  In this state, the background was   diffusely disorganized and comprised of an admixture of delta and theta range   frequencies, which were symmetric.  Spontaneous variability and reactivity were   noted.  During deeper stages of clinical sleep, symmetric sleep spindles were   noted.  There were no interictal epileptiform abnormalities and no clinical or   electrographic seizures were recorded.    Portions of the record are obscured by technical artifacts related to electrode   FZ.    The EKG channel revealed sinus rhythm.    IMPRESSION:  This is an abnormal EEG during lethargic state.  Diffuse   disorganized slowing of the background was noted.    CLINICAL CORRELATION:  The patient is a 67-year-old female who is being treated   for uremic encephalopathy and is currently not maintained on any anti-seizure   medications.  This is an abnormal EEG during lethargic state.  The overall   degree of slowing and disorganization is suggestive of a moderate degree of   encephalopathy, nonspecific to the cause.  There is no evidence of an epileptic   process on this recording.  No seizures were recorded during this study.      FAK/IN  dd: 12/29/2017 17:41:04 (CST)  td: 12/29/2017 17:56:57 (CST)  Doc ID   #[id number]  Job ID #[id number]    CC:

## 2017-12-29 NOTE — SUBJECTIVE & OBJECTIVE
Interval History: BP improved, still on levo (0.1 mg).  Pt awakes to sound and follows commands.  Alert to name and birthday, but unsure of year, situation, location, medical problems.  Denies pain, SOB, chest pain.    Review of Systems   Respiratory: Negative for shortness of breath.    Cardiovascular: Negative for chest pain.   Gastrointestinal: Negative for abdominal pain.   Neurological: Positive for tremors.        Difficulty in performing tasks even when she understands the command (raising arms)   Psychiatric/Behavioral: Positive for confusion.      Limited by mental status    Objective:     Vital Signs (Most Recent):  Temp: 98.6 °F (37 °C) (12/29/17 0841)  Pulse: 82 (12/29/17 0911)  Resp: 17 (12/29/17 0911)  BP: (!) 139/105 (12/29/17 0911)  SpO2: 99 % (12/29/17 0911) Vital Signs (24h Range):  Temp:  [97.8 °F (36.6 °C)-98.9 °F (37.2 °C)] 98.6 °F (37 °C)  Pulse:  [] 82  Resp:  [12-29] 17  SpO2:  [94 %-100 %] 99 %  BP: ()/() 139/105     Weight: 97.5 kg (214 lb 15.2 oz)  Body mass index is 33.67 kg/m².    Intake/Output Summary (Last 24 hours) at 12/29/17 0933  Last data filed at 12/29/17 0600   Gross per 24 hour   Intake          1923.45 ml   Output             1360 ml   Net           563.45 ml      Physical Exam   Constitutional: She appears well-developed. She appears distressed.   Obese, minimally able to participate in exam   HENT:   Head: Normocephalic and atraumatic.   Nose: Nose normal.   Cardiovascular: Normal rate and regular rhythm.    No murmur heard.  PVCs on monitor, distant heart sounds (significant adipose)   Pulmonary/Chest: Effort normal and breath sounds normal. No respiratory distress.   On 2 L NC, clear anteriorly   Abdominal: Soft. Bowel sounds are normal. She exhibits no distension and no mass. There is tenderness. There is no rebound and no guarding.   Midline scar healing   Musculoskeletal:   Able to move toes, did not move legs when asked; essential tremor    Neurological: She is alert.   oriented   Skin: Skin is warm and dry. Capillary refill takes less than 2 seconds. No erythema. There is pallor.   Psychiatric:   Altered, minimally responsive, confused   Nursing note and vitals reviewed.    Significant Labs:   Blood Culture: No results for input(s): LABBLOO in the last 48 hours.  BMP:   Recent Labs  Lab 12/29/17 0336   *      K 5.1      CO2 15*   BUN 72*   CREATININE 4.6*   CALCIUM 6.4*   MG 1.3*     CBC:   Recent Labs  Lab 12/29/17 0336   WBC 6.09   HGB 8.9*   HCT 28.1*   *     Lactic Acid:   Recent Labs  Lab 12/29/17 0334   LACTATE 0.7     Magnesium:   Recent Labs  Lab 12/29/17 0336   MG 1.3*     Significant Imaging: I have reviewed all pertinent imaging results/findings within the past 24 hours.

## 2017-12-29 NOTE — HPI
66 y/o white female with PMH of HTN, HLD, CAD, COPD (home O2 2L PRN), DM2, anxiety with depression presented to outside hospital for AMS x 1 day. Per review of outside paper notes the daughter-in-law noticed she's been altered since yesterday and has progressively worsened. Patient reports that she has chronic diarrhea for many years but it worsened recently. She does state that she has recently seen blood in her stool. She denies sick contacts or others at home with similar symptoms. She is able to answer questions when repeatedly prompted, however she remains altered and oriented only to self.

## 2017-12-29 NOTE — CONSULTS
[facility]  Hematology/Oncology  Consult Note    Patient Name: Sanam Bocanegra  MRN: [id number]  Admission Date: 12/29/2017  Hospital Length of Stay: 0 days  Code Status: Full Code   Attending Provider: Roc Luz MD  Consulting Provider: Yash Lott MD  Primary Care Physician: Primary Doctor No  Principal Problem:ARF (acute renal failure)    Consults  Subjective:     HPI:  66 yo female transferred from outside facility for management of uremic encephalopathy, ARF, severe dehydration secondary to Campylobacter infection.    Evaluated by nephrology and dialysis being contemplated.    Heme consulted for possible TTP and potential need for plasma exchange.    Pt apparently was diagnosed with a form of blood cancer, has been seeing Dr.Michael Ramey in [place] and was at one time treated with decadron 10 pills weekly along with a weekly injection - seems like multiple myeloma. But apparently has been doing well with regards to that.        Oncology Treatment Plan:   [No treatment plan]    Medications:  Continuous Infusions:   norepinephrine bitartrate-D5W 0.2 mcg/kg/min (12/29/17 1013)    sodium bicarbonate drip 100 mL/hr at 12/29/17 1008     Scheduled Meds:   azithromycin  500 mg Oral Daily    ceFEPime (MAXIPIME) IVPB  1 g Intravenous Daily    ciprofloxacin  400 mg Intravenous Q12H     PRN Meds:dextrose 50%, dextrose 50%, glucagon (human recombinant), glucose, glucose, insulin aspart, sodium chloride 0.9%     Review of patient's allergies indicates:   Allergen Reactions    Penicillins         No past medical history on file.  No past surgical history on file.  Family History     None        Social History Main Topics    Smoking status: Not on file    Smokeless tobacco: Not on file    Alcohol use Not on file    Drug use: Unknown    Sexual activity: Not on file       Review of Systems   Unable to perform ROS: Acuity of condition   Gastrointestinal: Positive for diarrhea.      Objective:     Vital Signs (Most Recent):  Temp: 98 °F (36.7 °C) (12/29/17 1545)  Pulse: 70 (12/29/17 1700)  Resp: 12 (12/29/17 1700)  BP: (!) 127/59 (12/29/17 1700)  SpO2: 99 % (12/29/17 1700) Vital Signs (24h Range):  Temp:  [97.8 °F (36.6 °C)-98.9 °F (37.2 °C)] 98 °F (36.7 °C)  Pulse:  [] 70  Resp:  [12-29] 12  SpO2:  [94 %-100 %] 99 %  BP: ()/() 127/59     Weight: 97.5 kg (214 lb 15.2 oz)  Body mass index is 33.67 kg/m².  Body surface area is 2.15 meters squared.      Intake/Output Summary (Last 24 hours) at 12/29/17 1714  Last data filed at 12/29/17 1638   Gross per 24 hour   Intake          1981.78 ml   Output             4110 ml   Net         -2128.22 ml       Physical Exam   Constitutional: She is cooperative. She does not appear ill. She appears distressed.   HENT:   Head: Head is without laceration, without right periorbital erythema and without left periorbital erythema.   Nose: No epistaxis.   Mouth/Throat: Oropharynx is clear and moist. No oropharyngeal exudate. No tonsillar exudate.   Eyes: Conjunctivae are normal.   Neck: Neck supple. No thyroid mass and no thyromegaly present.   Cardiovascular: Normal rate and regular rhythm.  Exam reveals no friction rub.    Pulmonary/Chest: Breath sounds normal. No accessory muscle usage or stridor. No tachypnea. No respiratory distress. Chest wall is not dull to percussion. She exhibits no tenderness.   Abdominal: Soft. She exhibits no distension, no ascites and no mass. There is no hepatosplenomegaly.   Musculoskeletal: She exhibits no edema.   Lymphadenopathy:        Head (right side): No submental and no submandibular adenopathy present.        Head (left side): No submental and no submandibular adenopathy present.     She has no cervical adenopathy.     She has no axillary adenopathy.   Neurological: She is alert. She displays tremor. No cranial nerve deficit.   Skin: No bruising, no petechiae and no rash noted. She is not diaphoretic.    Psychiatric: She has a normal mood and affect.   Nursing note and vitals reviewed.      Significant Labs:   All pertinent labs from the last 24 hours have been reviewed.    Diagnostic Results:  I have reviewed all pertinent imaging results/findings within the past 24 hours.    Assessment/Plan:     Thrombocytopenia    Her thrombocytopenia is secondary to underlying myeloma and ongoing Campylobacter infection.    LDH WNL. Clinical picture not consistent with TTP.     D/w  (nephrology).    Continue supportive care and dialysis if needed - Currently she has excellent urine output.    At this time, no further work-up planned from heme stand-point.          Thank you for your consult.     aYsh Lott MD  Hematology/Oncology  [facility]

## 2017-12-29 NOTE — SUBJECTIVE & OBJECTIVE
Oncology Treatment Plan:   [No treatment plan]    Medications:  Continuous Infusions:   norepinephrine bitartrate-D5W 0.2 mcg/kg/min (12/29/17 1013)    sodium bicarbonate drip 100 mL/hr at 12/29/17 1008     Scheduled Meds:   azithromycin  500 mg Oral Daily    ceFEPime (MAXIPIME) IVPB  1 g Intravenous Daily    ciprofloxacin  400 mg Intravenous Q12H     PRN Meds:dextrose 50%, dextrose 50%, glucagon (human recombinant), glucose, glucose, insulin aspart, sodium chloride 0.9%     Review of patient's allergies indicates:   Allergen Reactions    Penicillins         No past medical history on file.  No past surgical history on file.  Family History     None        Social History Main Topics    Smoking status: Not on file    Smokeless tobacco: Not on file    Alcohol use Not on file    Drug use: Unknown    Sexual activity: Not on file       Review of Systems   Unable to perform ROS: Acuity of condition   Gastrointestinal: Positive for diarrhea.     Objective:     Vital Signs (Most Recent):  Temp: 98 °F (36.7 °C) (12/29/17 1545)  Pulse: 70 (12/29/17 1700)  Resp: 12 (12/29/17 1700)  BP: (!) 127/59 (12/29/17 1700)  SpO2: 99 % (12/29/17 1700) Vital Signs (24h Range):  Temp:  [97.8 °F (36.6 °C)-98.9 °F (37.2 °C)] 98 °F (36.7 °C)  Pulse:  [] 70  Resp:  [12-29] 12  SpO2:  [94 %-100 %] 99 %  BP: ()/() 127/59     Weight: 97.5 kg (214 lb 15.2 oz)  Body mass index is 33.67 kg/m².  Body surface area is 2.15 meters squared.      Intake/Output Summary (Last 24 hours) at 12/29/17 1714  Last data filed at 12/29/17 1638   Gross per 24 hour   Intake          1981.78 ml   Output             4110 ml   Net         -2128.22 ml       Physical Exam   Constitutional: She is cooperative. She does not appear ill. She appears distressed.   HENT:   Head: Head is without laceration, without right periorbital erythema and without left periorbital erythema.   Nose: No epistaxis.   Mouth/Throat: Oropharynx is clear and moist. No  oropharyngeal exudate. No tonsillar exudate.   Eyes: Conjunctivae are normal.   Neck: Neck supple. No thyroid mass and no thyromegaly present.   Cardiovascular: Normal rate and regular rhythm.  Exam reveals no friction rub.    Pulmonary/Chest: Breath sounds normal. No accessory muscle usage or stridor. No tachypnea. No respiratory distress. Chest wall is not dull to percussion. She exhibits no tenderness.   Abdominal: Soft. She exhibits no distension, no ascites and no mass. There is no hepatosplenomegaly.   Musculoskeletal: She exhibits no edema.   Lymphadenopathy:        Head (right side): No submental and no submandibular adenopathy present.        Head (left side): No submental and no submandibular adenopathy present.     She has no cervical adenopathy.     She has no axillary adenopathy.   Neurological: She is alert. She displays tremor. No cranial nerve deficit.   Skin: No bruising, no petechiae and no rash noted. She is not diaphoretic.   Psychiatric: She has a normal mood and affect.   Nursing note and vitals reviewed.      Significant Labs:   All pertinent labs from the last 24 hours have been reviewed.    Diagnostic Results:  I have reviewed all pertinent imaging results/findings within the past 24 hours.

## 2017-12-29 NOTE — HOSPITAL COURSE
12/29: Upon further review in the AM after admit,  mentions pt has a history of leukemia.  He is unsure of further details.  She receives her care often at [facility].      Contact [facility] & having nursing supervisor fax over medical records to ICU. Talked to Nephro and having urine labs ordered stat and Renal Ultrasound stat to evaluate for cause of NARGIS.  Pt's kidneys improved significantly on HOD#1 as pt put out > 5.2 liters of fluid in 1st 24 hours.

## 2017-12-29 NOTE — PT/OT/SLP EVAL
"Speech Language Pathology Evaluation  Bedside Swallow    Patient Name:  Sanam Bocanegra   MRN:  [id number]  Admitting Diagnosis: ARF (acute renal failure)    Recommendations:                 General Recommendations:  Dysphagia therapy  Diet recommendations:  NPO, NPO   Aspiration Precautions: Strict aspiration precautions   General Precautions: Standard, fall, contact, NPO  Communication strategies:  provide increased time to answer    History:     No past medical history on file.    No past surgical history on file.    Social History: Patient lives with spouse.    Prior Intubation HX:  N/A    Modified Barium Swallow: N/A    Chest X-Rays: Increased perihilar lung opacities, possible pulmonary edema atelectasis, and/or infiltrate.  Short-term radiographic followup could be performed to ensure resolution    Prior diet: Per pt, regular textures/thin liquids.      Subjective     SLP consulted for clinical swallow eval. SLP confirmed with RN prior to entry. Pt found in bed with pt;s spouse at bedside. Pt appeared lethargic, as pt was observed with eyes closed. Pt woke to verbal cuing from SLP. However, pt was observed with eyes closes throughout eval. Pt with limited speech production, pt mainly answered Y/N with head nods.    Patient goals: "I sometimes cough when I eat or drink" per pt     Objective:     Oral Musculature Evaluation  · Oral Musculature: general weakness  · Dentition: present and adequate  · Mucosal Quality: dry, sticky  · Mandibular Strength and Mobility:  (reduced strength)  · Oral Labial Strength and Mobility:  (reduced strength)  · Lingual Strength and Mobility:  (general weakness)  · Buccal Strength and Mobility: flaccid  · Volitional Swallow:  (delayed trigger of swallow (5s) per hand palpation)  · Voice Prior to PO Intake:  (low volume, limited speech production)    Bedside Swallow Eval:  Pt participated in BSS this AM. Pt was observed to hold thin liquids in oral cavity for 10-15s before initiating " swallow, despite max cuing from SLP. Pt was also observed with a delayed trigger of swallow for puree textures x2.    Consistencies Assessed:  · Thin liquids -via cup x3  · Puree -tsp x2     Oral Phase:   · Inability to open lips  · Poor oral acceptance  · Slow oral transit time    Pharyngeal Phase:   · decreased hyolaryngeal excursion to palpation  · delayed swallow initation  · throat clearing    Compensatory Strategies  · Volitional cough/throat clear    Treatment: SLP educated pt and pt's spouse on role of SLP, BSS, diet recs/swallow precautions and POC. Both acknowledged and confirmed understanding.         Assessment:     Sanam Bocanegra is a 67 y.o. female admitted with acute renal failure.  She presents with oropharyngeal dysphagia at bedside c/b poor oral acceptance, delayed AP transfer time, as well as delayed initiationof swallow and intermittent throat clearing s/p swallow. SLP recs: Pt is not safe for an oral diet at this time. Con't NPO with alt means of nutrition. SLP will cont to follow.    Goals:    SLP Goals        Problem: SLP Goal    Goal Priority Disciplines Outcome   SLP Goal     SLP Ongoing (interventions implemented as appropriate)   Description:  Short Term Goals:  1. Pt will participate in an ongoing BSS to determine least restrictive diet.                        Plan:     · Patient to be seen:  3 x/week   · Plan of Care expires:  01/28/18  · Plan of Care reviewed with:  patient, spouse (LAMAR Bullard and MD team)   · SLP Follow-Up:  Yes       Discharge recommendations:   (TBD)   Barriers to Discharge:  None    Time Tracking:     SLP Treatment Date:   12/29/17  Speech Start Time:  1037  Speech Stop Time:  1051     Speech Total Time (min):  14 min    Billable Minutes: Eval Swallow and Oral Function 14    MARIA EUGENIA Newman, CF-SLP  Speech-Language Pathologist   12/29/2017

## 2017-12-29 NOTE — HPI
68 yo female transferred from outside facility for management of uremic encephalopathy, ARF, severe dehydration secondary to Campylobacter infection.    Evaluated by nephrology and dialysis being contemplated.    Heme consulted for possible TTP and potential need for plasma exchange.    Pt apparently was diagnosed with a form of blood cancer, has been seeing Dr.Michael Ramey in [place] and was at one time treated with decadron 10 pills weekly along with a weekly injection - seems like multiple myeloma. But apparently has been doing well with regards to that.

## 2017-12-30 PROBLEM — A41.9 SEPTIC SHOCK: Status: ACTIVE | Noted: 2017-12-30

## 2017-12-30 PROBLEM — N17.9 ARF (ACUTE RENAL FAILURE): Status: RESOLVED | Noted: 2017-12-29 | Resolved: 2017-12-30

## 2017-12-30 PROBLEM — E87.20 METABOLIC ACIDOSIS: Status: ACTIVE | Noted: 2017-12-30

## 2017-12-30 PROBLEM — R65.21 SEPTIC SHOCK: Status: ACTIVE | Noted: 2017-12-30

## 2017-12-30 PROBLEM — E86.0 DEHYDRATION: Status: RESOLVED | Noted: 2017-12-28 | Resolved: 2017-12-30

## 2017-12-30 LAB
ALBUMIN SERPL BCP-MCNC: 1.8 G/DL
ALBUMIN SERPL BCP-MCNC: 1.9 G/DL
ALLENS TEST: ABNORMAL
ALP SERPL-CCNC: 82 U/L
ALT SERPL W/O P-5'-P-CCNC: 6 U/L
ANION GAP SERPL CALC-SCNC: 12 MMOL/L
ANION GAP SERPL CALC-SCNC: 13 MMOL/L
AST SERPL-CCNC: 10 U/L
BACTERIA UR CULT: NO GROWTH
BASOPHILS # BLD AUTO: 0.01 K/UL
BASOPHILS NFR BLD: 0.3 %
BILIRUB SERPL-MCNC: 0.3 MG/DL
BUN SERPL-MCNC: 41 MG/DL
BUN SERPL-MCNC: 53 MG/DL
C DIFF GDH STL QL: NEGATIVE
C DIFF TOX A+B STL QL IA: NEGATIVE
CALCIUM SERPL-MCNC: 6.5 MG/DL
CALCIUM SERPL-MCNC: 6.6 MG/DL
CHLORIDE SERPL-SCNC: 105 MMOL/L
CHLORIDE SERPL-SCNC: 111 MMOL/L
CO2 SERPL-SCNC: 25 MMOL/L
CO2 SERPL-SCNC: 30 MMOL/L
CREAT SERPL-MCNC: 2.3 MG/DL
CREAT SERPL-MCNC: 2.8 MG/DL
DELSYS: ABNORMAL
DIFFERENTIAL METHOD: ABNORMAL
EOSINOPHIL # BLD AUTO: 0.2 K/UL
EOSINOPHIL NFR BLD: 5.2 %
ERYTHROCYTE [DISTWIDTH] IN BLOOD BY AUTOMATED COUNT: 15.5 %
EST. GFR  (AFRICAN AMERICAN): 19 ML/MIN/1.73 M^2
EST. GFR  (AFRICAN AMERICAN): 24 ML/MIN/1.73 M^2
EST. GFR  (NON AFRICAN AMERICAN): 17 ML/MIN/1.73 M^2
EST. GFR  (NON AFRICAN AMERICAN): 21 ML/MIN/1.73 M^2
FLOW: 2
GLUCOSE SERPL-MCNC: 237 MG/DL
GLUCOSE SERPL-MCNC: 307 MG/DL
HCO3 UR-SCNC: 31.3 MMOL/L (ref 24–28)
HCT VFR BLD AUTO: 26.1 %
HGB BLD-MCNC: 8.4 G/DL
LYMPHOCYTES # BLD AUTO: 0.5 K/UL
LYMPHOCYTES NFR BLD: 14.7 %
MAGNESIUM SERPL-MCNC: 1 MG/DL
MCH RBC QN AUTO: 31.1 PG
MCHC RBC AUTO-ENTMCNC: 32.2 G/DL
MCV RBC AUTO: 97 FL
MODE: ABNORMAL
MONOCYTES # BLD AUTO: 0.5 K/UL
MONOCYTES NFR BLD: 14.4 %
NEUTROPHILS # BLD AUTO: 2.4 K/UL
NEUTROPHILS NFR BLD: 64.3 %
PCO2 BLDA: 42.8 MMHG (ref 35–45)
PH SMN: 7.47 [PH] (ref 7.35–7.45)
PHOSPHATE SERPL-MCNC: 2.4 MG/DL
PHOSPHATE SERPL-MCNC: 2.9 MG/DL
PLATELET # BLD AUTO: 98 K/UL
PMV BLD AUTO: 11.5 FL
PO2 BLDA: 97 MMHG (ref 80–100)
POC BE: 8 MMOL/L
POC SATURATED O2: 98 % (ref 95–100)
POC TCO2: 33 MMOL/L (ref 23–27)
POCT GLUCOSE: 208 MG/DL (ref 70–110)
POCT GLUCOSE: 243 MG/DL (ref 70–110)
POCT GLUCOSE: 288 MG/DL (ref 70–110)
POCT GLUCOSE: 334 MG/DL (ref 70–110)
POCT GLUCOSE: 350 MG/DL (ref 70–110)
POTASSIUM SERPL-SCNC: 3.6 MMOL/L
POTASSIUM SERPL-SCNC: 3.8 MMOL/L
PROT SERPL-MCNC: 5.8 G/DL
RBC # BLD AUTO: 2.7 M/UL
SAMPLE: ABNORMAL
SITE: ABNORMAL
SODIUM SERPL-SCNC: 147 MMOL/L
SODIUM SERPL-SCNC: 149 MMOL/L
SP02: 98
WBC # BLD AUTO: 3.68 K/UL

## 2017-12-30 PROCEDURE — 84100 ASSAY OF PHOSPHORUS: CPT

## 2017-12-30 PROCEDURE — G8996 SWALLOW CURRENT STATUS: HCPCS | Mod: CK

## 2017-12-30 PROCEDURE — 87427 SHIGA-LIKE TOXIN AG IA: CPT | Mod: 59

## 2017-12-30 PROCEDURE — 85025 COMPLETE CBC W/AUTO DIFF WBC: CPT

## 2017-12-30 PROCEDURE — 80069 RENAL FUNCTION PANEL: CPT

## 2017-12-30 PROCEDURE — 25000003 PHARM REV CODE 250: Performed by: FAMILY MEDICINE

## 2017-12-30 PROCEDURE — G8997 SWALLOW GOAL STATUS: HCPCS | Mod: CI

## 2017-12-30 PROCEDURE — 82803 BLOOD GASES ANY COMBINATION: CPT

## 2017-12-30 PROCEDURE — 80053 COMPREHEN METABOLIC PANEL: CPT

## 2017-12-30 PROCEDURE — 87209 SMEAR COMPLEX STAIN: CPT

## 2017-12-30 PROCEDURE — 87046 STOOL CULTR AEROBIC BACT EA: CPT | Mod: 59

## 2017-12-30 PROCEDURE — 25000003 PHARM REV CODE 250: Performed by: INTERNAL MEDICINE

## 2017-12-30 PROCEDURE — 87449 NOS EACH ORGANISM AG IA: CPT

## 2017-12-30 PROCEDURE — 83735 ASSAY OF MAGNESIUM: CPT

## 2017-12-30 PROCEDURE — 63600175 PHARM REV CODE 636 W HCPCS: Performed by: INTERNAL MEDICINE

## 2017-12-30 PROCEDURE — 25000003 PHARM REV CODE 250: Performed by: STUDENT IN AN ORGANIZED HEALTH CARE EDUCATION/TRAINING PROGRAM

## 2017-12-30 PROCEDURE — 20000000 HC ICU ROOM

## 2017-12-30 PROCEDURE — 87045 FECES CULTURE AEROBIC BACT: CPT

## 2017-12-30 PROCEDURE — 94761 N-INVAS EAR/PLS OXIMETRY MLT: CPT

## 2017-12-30 PROCEDURE — 27000221 HC OXYGEN, UP TO 24 HOURS

## 2017-12-30 PROCEDURE — 36600 WITHDRAWAL OF ARTERIAL BLOOD: CPT

## 2017-12-30 PROCEDURE — 92526 ORAL FUNCTION THERAPY: CPT

## 2017-12-30 PROCEDURE — 80202 ASSAY OF VANCOMYCIN: CPT

## 2017-12-30 PROCEDURE — 63600175 PHARM REV CODE 636 W HCPCS: Performed by: FAMILY MEDICINE

## 2017-12-30 PROCEDURE — 63600175 PHARM REV CODE 636 W HCPCS: Performed by: STUDENT IN AN ORGANIZED HEALTH CARE EDUCATION/TRAINING PROGRAM

## 2017-12-30 RX ORDER — MAGNESIUM SULFATE HEPTAHYDRATE 40 MG/ML
4 INJECTION, SOLUTION INTRAVENOUS ONCE
Status: COMPLETED | OUTPATIENT
Start: 2017-12-30 | End: 2017-12-30

## 2017-12-30 RX ORDER — MAGNESIUM SULFATE HEPTAHYDRATE 40 MG/ML
2 INJECTION, SOLUTION INTRAVENOUS ONCE
Status: COMPLETED | OUTPATIENT
Start: 2017-12-30 | End: 2017-12-30

## 2017-12-30 RX ORDER — SODIUM CHLORIDE 450 MG/100ML
INJECTION, SOLUTION INTRAVENOUS CONTINUOUS
Status: DISCONTINUED | OUTPATIENT
Start: 2017-12-30 | End: 2017-12-31

## 2017-12-30 RX ADMIN — DEXTROSE MONOHYDRATE 20 MMOL: 5 INJECTION, SOLUTION INTRAVENOUS at 09:12

## 2017-12-30 RX ADMIN — SODIUM CHLORIDE: 0.45 INJECTION, SOLUTION INTRAVENOUS at 08:12

## 2017-12-30 RX ADMIN — SODIUM CHLORIDE: 0.45 INJECTION, SOLUTION INTRAVENOUS at 03:12

## 2017-12-30 RX ADMIN — MAGNESIUM SULFATE IN WATER 2 G: 40 INJECTION, SOLUTION INTRAVENOUS at 08:12

## 2017-12-30 RX ADMIN — CIPROFLOXACIN 400 MG: 2 INJECTION, SOLUTION INTRAVENOUS at 11:12

## 2017-12-30 RX ADMIN — DEXTROSE MONOHYDRATE: 5 INJECTION, SOLUTION INTRAVENOUS at 05:12

## 2017-12-30 RX ADMIN — INSULIN ASPART 10 UNITS: 100 INJECTION, SOLUTION INTRAVENOUS; SUBCUTANEOUS at 11:12

## 2017-12-30 RX ADMIN — INSULIN ASPART 4 UNITS: 100 INJECTION, SOLUTION INTRAVENOUS; SUBCUTANEOUS at 11:12

## 2017-12-30 RX ADMIN — INSULIN ASPART 4 UNITS: 100 INJECTION, SOLUTION INTRAVENOUS; SUBCUTANEOUS at 04:12

## 2017-12-30 RX ADMIN — CEFEPIME 1 G: 1 INJECTION, POWDER, FOR SOLUTION INTRAMUSCULAR; INTRAVENOUS at 08:12

## 2017-12-30 RX ADMIN — INSULIN ASPART 8 UNITS: 100 INJECTION, SOLUTION INTRAVENOUS; SUBCUTANEOUS at 12:12

## 2017-12-30 RX ADMIN — INSULIN ASPART 2 UNITS: 100 INJECTION, SOLUTION INTRAVENOUS; SUBCUTANEOUS at 08:12

## 2017-12-30 RX ADMIN — INSULIN DETEMIR 3 UNITS: 100 INJECTION, SOLUTION SUBCUTANEOUS at 08:12

## 2017-12-30 RX ADMIN — MAGNESIUM SULFATE IN WATER 4 G: 40 INJECTION, SOLUTION INTRAVENOUS at 04:12

## 2017-12-30 NOTE — PT/OT/SLP PROGRESS
Speech Language Pathology Treatment    Patient Name:  Sanam Bocanegra   MRN:  [id number]  Admitting Diagnosis: ARF (acute renal failure)    Recommendations:                 General Recommendations:  Dysphagia therapy and Speech/language therapy  Diet recommendations:  Puree, Liquid Diet Level: Thin (No straws)   Aspiration Precautions: 1 bite/sip at a time, Alternating bites/sips, Assistance with meals, Avoid talking while eating, Eliminate distractions, HOB to 90 degrees, No straws, Remain upright 30 minutes post meal and Small bites/sips   General Precautions: Standard, aspiration, fall, contact      Subjective     Awake, alert, and cooperative.     Pain/Comfort:  · Pain Rating 1: 0/10  · Pain Rating Post-Intervention 1: 0/10    Objective:     Has the patient been evaluated by SLP for swallowing?   Yes  Keep patient NPO? No   Current Respiratory Status: nasal cannula      Cognition: Cues needed to focus and sustain attention to tasks throughout session. Easily distracted. Demonstrated orientation to name and location in a hospital. Not name of hospital, reason for hospitalization, year, month or date. Followed simple directions 100% of the time given cues for attention. Verbal expression is fluent, verbose, and tangential. Frequent confused comments. Redirections needed throughout session.    Swallowing: Provided PO trials with ice chips, thin liquid via spoon, cup, and straw, puree, and solids. 1:1 assist needed, though some self-feeding was elicited. Frequent cues provided for safe swallowing strategies. ORAL PHASE: Lip seal, bolus formation, and A-P transit WNL. Prolonged mastication of solids noted with talking 100% of the time during mastication. Mild oral residue noted after solids. Cleared with thin liquid emilia. PHARYNGEAL PHASE: Dcr hyolaryngeal elevation noted subjectively upon palpation. No overt s/s of airway penetration and/or aspiration noted today. No coughing, choking, throat clearing, or change in  "vocal quality. Pt c/o odynophagia and stated, "That was hard!" after straw sips of liquid and solid trials. S/s of pharyngeal residue characterized by multiple swallows per bolus.     Assessment:     Sanam Bocanegra is a 67 y.o. female with an SLP diagnosis of Dysphagia and Cognitive-Linguistic Impairment.  She presents with improvement today and appears safe to begin a puree diet with assist during meals and safe swallowing strategies.    Goals:    SLP Goals        Problem: SLP Goal    Goal Priority Disciplines Outcome   SLP Goal     SLP Ongoing (interventions implemented as appropriate)   Description:  Short Term Goals:  1. Pt will participate in an ongoing BSS to determine least restrictive diet.               Problem: SLP Goal    Goal Priority Disciplines Outcome   SLP Goal     SLP Ongoing (interventions implemented as appropriate)   Description:  Short Term Goals:  1. Pt will consume % of puree diet with thin liquids without overt s/s of aspiration given mod assist.   2. Pt will implement safe swallowing strategies 100% of the time during meals given mod assist.   3. Pt will consume 2/2 solids without overt s/s of aspiration given mod assist.   4. Pt will answer orientation questions with 100% accuracy given mod assist.                     Plan:     · Patient to be seen:  3 x/week   · Plan of Care expires:  01/28/18  · Plan of Care reviewed with:  patient, spouse   · SLP Follow-Up:  Yes           Time Tracking:     SLP Treatment Date:   12/30/17  Speech Start Time:  1225  Speech Stop Time:  1240     Speech Total Time (min):  15 min    Billable Minutes: Treatment Swallowing Dysfunction 15 minutes    Reid Pruett CCC-SLP  12/30/2017  "

## 2017-12-30 NOTE — PLAN OF CARE
Problem: SLP Goal  Goal: SLP Goal  Short Term Goals:  1. Pt will consume % of puree diet with thin liquids without overt s/s of aspiration given mod assist.   2. Pt will implement safe swallowing strategies 100% of the time during meals given mod assist.   3. Pt will consume 2/2 solids without overt s/s of aspiration given mod assist.   4. Pt will answer orientation questions with 100% accuracy given mod assist.   Outcome: Ongoing (interventions implemented as appropriate)  Improvement noted today during dysphagia therapy. Recommending a puree diet with thin liquids. No straws. Assist with meals. Small bites/sips, slow rate, alternate food and liquids, multiple swallows per bolus.   Reid Pruett MS, CCC-SLP

## 2017-12-30 NOTE — PLAN OF CARE
Problem: Patient Care Overview  Goal: Plan of Care Review  Outcome: Ongoing (interventions implemented as appropriate)  POC reviewed with pt and spouse. Questions answered, spouse verbalized understanding. Call light within reach.

## 2017-12-30 NOTE — PROGRESS NOTES
Progress Note  [facility]    Admit Date: 12/29/2017   LOS: 1 day     SUBJECTIVE:     Follow-up For:  NARGIS on CKD    Review of Systems:  More awake and alert this morning  Dr. Doss's consult note was appreciated    OBJECTIVE:     Vital Signs (Most Recent)  Temp: 98 °F (36.7 °C) (12/30/17 1115)  Pulse: 66 (12/30/17 1400)  Resp: 12 (12/30/17 1400)  BP: 104/66 (12/30/17 1345)  SpO2: 99 % (12/30/17 1400)    Vital Signs Range (Last 24H):  Temp:  [97.3 °F (36.3 °C)-98.2 °F (36.8 °C)]   Pulse:  []   Resp:  [10-23]   BP: ()/(46-94)   SpO2:  [96 %-100 %]     I & O (Last 24H):  Intake/Output Summary (Last 24 hours) at 12/30/17 1435  Last data filed at 12/30/17 1400   Gross per 24 hour   Intake          2107.49 ml   Output             4205 ml   Net         -2097.51 ml     Physical Exam:  Poorly groomed   NAD  No JVD  No murmurs or gallops  Lungs decrease soft +BS  Soft BS + tenderness on palpation  Ext: no edema + petechia     Laboratory:  CBC:    Recent Labs  Lab 12/30/17  0455   WBC 3.68*   RBC 2.70*   HGB 8.4*   HCT 26.1*   PLT 98*     BMP:    Recent Labs  Lab 12/30/17  0455   *   K 3.6   *   CO2 25   BUN 53*   CREATININE 2.8*   CALCIUM 6.6*          ASSESSMENT/PLAN:       A 66 y/o female with PMHx of HTN, HLD, COPD on oxygen, obesity, DM presents to OSH with AMS/encephalopathy and diarrhea. She was diagnosed with stage 3 NARGIS, anemia, thrombocytopenia and campylobacter diarrhea.       1- stage 3 NARGIS etiology is unclear but differential diagnosis should include   -- TTP/HUS secondary to campylobacter colitis  -- ATN d/t hypotension  -- AIN drug induced Levaquin vs infection      2- severe metabolic acidosis required bicarb infusion     3- encephalopathy etiology is unclear    4- CKD d/t multiple myeloma was on decadron      Plan:      -- switch IVF to 0.45 %  ml/hr     -- monitor UOP      -- repeat RFP and ABG     -- continue with levophed infusion to maintain a MAP > 65    -- continue with IV  cefepime and cipro

## 2017-12-30 NOTE — SUBJECTIVE & OBJECTIVE
Interval History: Pt's mentation, kidney function and blood pressure improved. Dr. Lott evaluated pt for hx of MM, possible TTP with camplyobacter, states no TTP concerns at this time and MM was well-controlled.  Still with memory concerns but able to express clearly.    Review of Systems   Denies pain, denies SOB, denies chest pain  States tremor is baseline  +Confusion    Objective:     Vital Signs (Most Recent):  Temp: 98.2 °F (36.8 °C) (12/30/17 0715)  Pulse: 67 (12/30/17 0930)  Resp: 11 (12/30/17 0930)  BP: (!) 101/50 (12/30/17 0930)  SpO2: 99 % (12/30/17 0930) Vital Signs (24h Range):  Temp:  [97.3 °F (36.3 °C)-98.2 °F (36.8 °C)] 98.2 °F (36.8 °C)  Pulse:  [] 67  Resp:  [10-29] 11  SpO2:  [95 %-100 %] 99 %  BP: ()/(34-94) 101/50     Weight: 97.5 kg (214 lb 15.2 oz)  Body mass index is 33.67 kg/m².    Intake/Output Summary (Last 24 hours) at 12/30/17 0952  Last data filed at 12/30/17 0845   Gross per 24 hour   Intake          1015.82 ml   Output             5505 ml   Net         -4489.18 ml      Physical Exam   Constitutional: She appears well-developed. No distress.   Obese, minimal effort/ability with gross movements   HENT:   Head: Normocephalic and atraumatic.   Nose: Nose normal.   Cardiovascular: Normal rate and regular rhythm.    No murmur heard.  distant heart sounds (significant adipose)   Pulmonary/Chest: Effort normal and breath sounds normal. No respiratory distress.   On 2 L NC, clear to asucultation   Abdominal: Soft. Bowel sounds are normal. She exhibits no distension and no mass. There is tenderness. There is no rebound and no guarding.   Midline scar healing   Musculoskeletal:   Able to move toes, did not move legs when asked; essential tremor   Neurological: She is alert.   oriented   Skin: Skin is warm and dry. Capillary refill takes less than 2 seconds. No erythema. There is pallor.   Psychiatric:   AA) X3, doesn't remember what brought her to hospital   Nursing note and vitals  reviewed.    Significant Labs:   CBC:   Recent Labs  Lab 12/29/17  0336 12/30/17  0455   WBC 6.09 3.68*   HGB 8.9* 8.4*   HCT 28.1* 26.1*   * 98*     CMP:   Recent Labs  Lab 12/29/17  0336 12/29/17  1623 12/30/17  0455    139 149*   K 5.1 5.4* 3.6    109 111*   CO2 15* 16* 25   * 607* 237*   BUN 72* 67* 53*   CREATININE 4.6* 3.8* 2.8*   CALCIUM 6.4* 6.5* 6.6*   PROT 6.1  --  5.8*   ALBUMIN 2.0* 2.0* 1.9*   BILITOT 0.4  --  0.3   ALKPHOS 90  --  82   AST 10  --  10   ALT 8*  --  6*   ANIONGAP 13 14 13   EGFRNONAA 9* 11* 17*     Magnesium:   Recent Labs  Lab 12/29/17  0336 12/30/17  0455   MG 1.3* 1.0*     Urine Culture:   Recent Labs  Lab 12/29/17  0239   LABURIN No growth     Urine Studies:   Recent Labs  Lab 12/29/17  0239   COLORU Yellow   APPEARANCEUA Clear   PHUR 5.0   SPECGRAV 1.020   PROTEINUA Trace*   GLUCUA 1+*   KETONESU Negative   BILIRUBINUA Negative   OCCULTUA 2+*   NITRITE Negative   UROBILINOGEN Negative   LEUKOCYTESUR Negative   RBCUA 12*   WBCUA 1   BACTERIA Occasional   SQUAMEPITHEL 5   HYALINECASTS 2*       Significant Imaging: I have reviewed all pertinent imaging results/findings within the past 24 hours.   CXR: Pulm edema vs infiltrate  Repeat CXR: unofficially- improved

## 2017-12-30 NOTE — PROGRESS NOTES
[facility] Hospital Medicine  Progress Note    Patient Name: Sanam Bocanegra  MRN: [id number]  Patient Class: IP- Inpatient   Admission Date: 12/29/2017  Length of Stay: 1 days  Attending Physician: Roc Luz MD  Primary Care Provider: Primary Doctor No        Subjective:     Principal Problem:ARF (acute renal failure)    HPI:  68 y/o white female with PMH of HTN, HLD, CAD, COPD (home O2 2L PRN), DM2, anxiety with depression presented to outside hospital for AMS x 1 day. Per review of outside paper notes the daughter-in-law noticed she's been altered since yesterday and has progressively worsened. Patient reports that she has chronic diarrhea for many years but it worsened recently. She does state that she has recently seen blood in her stool. She denies sick contacts or others at home with similar symptoms. She is able to answer questions when repeatedly prompted, however she remains altered and oriented only to self.     Hospital Course:  12/29: Upon further review in the AM after admit,  mentions pt has a history of leukemia.  He is unsure of further details.  She receives her care often at [facility].      Contact [facility] & having nursing supervisor fax over medical records to ICU. Talked to Nephro and having urine labs ordered stat and Renal Ultrasound stat to evaluate for cause of NARGIS.  Pt's kidneys improved significantly on HOD#1 as pt put out > 5.2 liters of fluid in 1st 24 hours.    Interval History: Pt's mentation, kidney function and blood pressure improved. Dr. Lott evaluated pt for hx of MM, possible TTP with camplyobacter, states no TTP concerns at this time and MM was well-controlled.  Still with memory concerns but able to express clearly.    Review of Systems   Denies pain, denies SOB, denies chest pain  States tremor is baseline  +Confusion    Objective:     Vital Signs (Most Recent):  Temp: 98.2 °F (36.8 °C) (12/30/17 0715)  Pulse: 67 (12/30/17 0930)  Resp: 11 (12/30/17  0930)  BP: (!) 101/50 (12/30/17 0930)  SpO2: 99 % (12/30/17 0930) Vital Signs (24h Range):  Temp:  [97.3 °F (36.3 °C)-98.2 °F (36.8 °C)] 98.2 °F (36.8 °C)  Pulse:  [] 67  Resp:  [10-29] 11  SpO2:  [95 %-100 %] 99 %  BP: ()/(34-94) 101/50     Weight: 97.5 kg (214 lb 15.2 oz)  Body mass index is 33.67 kg/m².    Intake/Output Summary (Last 24 hours) at 12/30/17 0952  Last data filed at 12/30/17 0845   Gross per 24 hour   Intake          1015.82 ml   Output             5505 ml   Net         -4489.18 ml      Physical Exam   Constitutional: She appears well-developed. No distress.   Obese, minimal effort/ability with gross movements   HENT:   Head: Normocephalic and atraumatic.   Nose: Nose normal.   Cardiovascular: Normal rate and regular rhythm.    No murmur heard.  distant heart sounds (significant adipose)   Pulmonary/Chest: Effort normal and breath sounds normal. No respiratory distress.   On 2 L NC, clear to asucultation   Abdominal: Soft. Bowel sounds are normal. She exhibits no distension and no mass. There is tenderness. There is no rebound and no guarding.   Midline scar healing   Musculoskeletal:   Able to move toes, did not move legs when asked; essential tremor   Neurological: She is alert.   oriented   Skin: Skin is warm and dry. Capillary refill takes less than 2 seconds. No erythema. There is pallor.   Psychiatric:   AA) X3, doesn't remember what brought her to hospital   Nursing note and vitals reviewed.    Significant Labs:   CBC:   Recent Labs  Lab 12/29/17  0336 12/30/17  0455   WBC 6.09 3.68*   HGB 8.9* 8.4*   HCT 28.1* 26.1*   * 98*     CMP:   Recent Labs  Lab 12/29/17  0336 12/29/17  1623 12/30/17  0455    139 149*   K 5.1 5.4* 3.6    109 111*   CO2 15* 16* 25   * 607* 237*   BUN 72* 67* 53*   CREATININE 4.6* 3.8* 2.8*   CALCIUM 6.4* 6.5* 6.6*   PROT 6.1  --  5.8*   ALBUMIN 2.0* 2.0* 1.9*   BILITOT 0.4  --  0.3   ALKPHOS 90  --  82   AST 10  --  10   ALT 8*  --   6*   ANIONGAP 13 14 13   EGFRNONAA 9* 11* 17*     Magnesium:   Recent Labs  Lab 12/29/17  0336 12/30/17  0455   MG 1.3* 1.0*     Urine Culture:   Recent Labs  Lab 12/29/17  0239   LABURIN No growth     Urine Studies:   Recent Labs  Lab 12/29/17  0239   COLORU Yellow   APPEARANCEUA Clear   PHUR 5.0   SPECGRAV 1.020   PROTEINUA Trace*   GLUCUA 1+*   KETONESU Negative   BILIRUBINUA Negative   OCCULTUA 2+*   NITRITE Negative   UROBILINOGEN Negative   LEUKOCYTESUR Negative   RBCUA 12*   WBCUA 1   BACTERIA Occasional   SQUAMEPITHEL 5   HYALINECASTS 2*       Significant Imaging: I have reviewed all pertinent imaging results/findings within the past 24 hours.   CXR: Pulm edema vs infiltrate  Repeat CXR: unofficially- improved    Assessment/Plan:         AMS  Improved  Potentially 2/2 uremic encephalopathy that is clearing  CT head neg for acute process, only showing chronic right mastoiditis  WBC down to 3.68 from 6.09 (hx of blood cancer, likely MM)  Afebrile, LA 1.9, repeat was 0.7  Urine studies demonstrate mixed pre-renal with intrinsic renal disease; nephrology assisting  Metabolic acidosis improved through day with D5W w/ 3 amps bicarb  Repeat speech eval as AMS improved     ARF  baseline is 20/0.8  Much improved, Nephro- [facility] assisting   -Stage 3 NARGIS w/ unclear etiology    -ATN 2/2 hypotension   -AIN drug induced (Levaquin at outside hospital  Vs infection)         Septic Shock with Hx of leukemia  Concern for sepsis with a confirmed source (Campylobacter), cannot trust lack of fever or WBC # because of hx of leukemia  Hypotensive at time of presentation and on arrival to [place] -improved on levophed [0.06 this AM]  Arrived on Levofed, will continue and titrate PRN for MAP >65  Already received Rocephin and Levaquin, now giving Broad spectrum abx (Cefepime, Cipro for Camploybacter)  UA concerning for UTI at OSH, but improved here (clear yellow, no signs of UTI)  BCx's and UC'x obtained at outside  hospital     Campylobacter diarrhea  Stool positive for Campylobacter at OSH  Already received Levaquin  Receiving Cipro  Stool studies with ova, parasites pending (no BM while in-patient)     Anemia  Likely 2/2 leukemia- pancytopenia  Monitor     Diet: NPO (pending speech eval)  Ppx: SCD's     Dispo: Monitor & wean BP, f/u cultures, ARF      VTE Risk Mitigation         Ordered     Medium Risk of VTE  Once      12/29/17 0226     Place sequential compression device  Until discontinued      12/29/17 0226          Critical care time spent on the evaluation and treatment of severe organ dysfunction, review of pertinent labs and imaging studies, discussions with consulting providers and discussions with patient/family: 45 minutes.    Reid Schaefer MD  Department of Hospital Medicine   [facility]

## 2017-12-31 PROBLEM — E86.0 DEHYDRATION: Status: ACTIVE | Noted: 2017-12-31

## 2017-12-31 LAB
ALBUMIN SERPL BCP-MCNC: 1.8 G/DL
ALP SERPL-CCNC: 75 U/L
ALT SERPL W/O P-5'-P-CCNC: 6 U/L
ANION GAP SERPL CALC-SCNC: 12 MMOL/L
ANISOCYTOSIS BLD QL SMEAR: SLIGHT
AST SERPL-CCNC: 10 U/L
BASOPHILS # BLD AUTO: ABNORMAL K/UL
BASOPHILS NFR BLD: 0 %
BILIRUB SERPL-MCNC: 0.3 MG/DL
BUN SERPL-MCNC: 32 MG/DL
CALCIUM SERPL-MCNC: 6.3 MG/DL
CHLORIDE SERPL-SCNC: 104 MMOL/L
CO2 SERPL-SCNC: 30 MMOL/L
CREAT SERPL-MCNC: 2.1 MG/DL
DACRYOCYTES BLD QL SMEAR: ABNORMAL
DIFFERENTIAL METHOD: ABNORMAL
EOSINOPHIL # BLD AUTO: ABNORMAL K/UL
EOSINOPHIL NFR BLD: 4 %
ERYTHROCYTE [DISTWIDTH] IN BLOOD BY AUTOMATED COUNT: 15.1 %
EST. GFR  (AFRICAN AMERICAN): 27 ML/MIN/1.73 M^2
EST. GFR  (NON AFRICAN AMERICAN): 23 ML/MIN/1.73 M^2
GLUCOSE SERPL-MCNC: 334 MG/DL
HCT VFR BLD AUTO: 22.3 %
HCT VFR BLD AUTO: 22.9 %
HGB BLD-MCNC: 7.1 G/DL
HGB BLD-MCNC: 7.3 G/DL
HYPOCHROMIA BLD QL SMEAR: ABNORMAL
LYMPHOCYTES # BLD AUTO: ABNORMAL K/UL
LYMPHOCYTES NFR BLD: 34 %
MAGNESIUM SERPL-MCNC: 1 MG/DL
MCH RBC QN AUTO: 31.5 PG
MCHC RBC AUTO-ENTMCNC: 31.9 G/DL
MCV RBC AUTO: 99 FL
MONOCYTES # BLD AUTO: ABNORMAL K/UL
MONOCYTES NFR BLD: 6 %
NEUTROPHILS NFR BLD: 54 %
NEUTS BAND NFR BLD MANUAL: 2 %
OVALOCYTES BLD QL SMEAR: ABNORMAL
PHOSPHATE SERPL-MCNC: 2.1 MG/DL
PLATELET # BLD AUTO: 59 K/UL
PLATELET BLD QL SMEAR: ABNORMAL
PMV BLD AUTO: 11.9 FL
POCT GLUCOSE: 229 MG/DL (ref 70–110)
POCT GLUCOSE: 257 MG/DL (ref 70–110)
POCT GLUCOSE: 267 MG/DL (ref 70–110)
POCT GLUCOSE: 274 MG/DL (ref 70–110)
POCT GLUCOSE: 331 MG/DL (ref 70–110)
POCT GLUCOSE: 349 MG/DL (ref 70–110)
POIKILOCYTOSIS BLD QL SMEAR: SLIGHT
POTASSIUM SERPL-SCNC: 3.3 MMOL/L
PROT SERPL-MCNC: 5.4 G/DL
RBC # BLD AUTO: 2.32 M/UL
SODIUM SERPL-SCNC: 146 MMOL/L
VANCOMYCIN SERPL-MCNC: 13.1 UG/ML
VANCOMYCIN SERPL-MCNC: 8 UG/ML
WBC # BLD AUTO: 1.7 K/UL

## 2017-12-31 PROCEDURE — 11000001 HC ACUTE MED/SURG PRIVATE ROOM

## 2017-12-31 PROCEDURE — 63600175 PHARM REV CODE 636 W HCPCS: Performed by: STUDENT IN AN ORGANIZED HEALTH CARE EDUCATION/TRAINING PROGRAM

## 2017-12-31 PROCEDURE — 25000003 PHARM REV CODE 250: Performed by: STUDENT IN AN ORGANIZED HEALTH CARE EDUCATION/TRAINING PROGRAM

## 2017-12-31 PROCEDURE — 27000221 HC OXYGEN, UP TO 24 HOURS

## 2017-12-31 PROCEDURE — 25000003 PHARM REV CODE 250: Performed by: FAMILY MEDICINE

## 2017-12-31 PROCEDURE — 83735 ASSAY OF MAGNESIUM: CPT

## 2017-12-31 PROCEDURE — 85014 HEMATOCRIT: CPT

## 2017-12-31 PROCEDURE — 85018 HEMOGLOBIN: CPT

## 2017-12-31 PROCEDURE — 80053 COMPREHEN METABOLIC PANEL: CPT

## 2017-12-31 PROCEDURE — 80202 ASSAY OF VANCOMYCIN: CPT

## 2017-12-31 PROCEDURE — 25000003 PHARM REV CODE 250: Performed by: INTERNAL MEDICINE

## 2017-12-31 PROCEDURE — 85027 COMPLETE CBC AUTOMATED: CPT

## 2017-12-31 PROCEDURE — 85007 BL SMEAR W/DIFF WBC COUNT: CPT

## 2017-12-31 PROCEDURE — 63600175 PHARM REV CODE 636 W HCPCS: Performed by: FAMILY MEDICINE

## 2017-12-31 PROCEDURE — 84100 ASSAY OF PHOSPHORUS: CPT

## 2017-12-31 RX ORDER — PANTOPRAZOLE SODIUM 40 MG/1
40 TABLET, DELAYED RELEASE ORAL 2 TIMES DAILY
Status: DISCONTINUED | OUTPATIENT
Start: 2017-12-31 | End: 2018-01-03 | Stop reason: HOSPADM

## 2017-12-31 RX ORDER — LANOLIN ALCOHOL/MO/W.PET/CERES
400 CREAM (GRAM) TOPICAL
Status: DISCONTINUED | OUTPATIENT
Start: 2017-12-31 | End: 2017-12-31

## 2017-12-31 RX ORDER — ONDANSETRON 2 MG/ML
4 INJECTION INTRAMUSCULAR; INTRAVENOUS EVERY 6 HOURS PRN
Status: DISCONTINUED | OUTPATIENT
Start: 2017-12-31 | End: 2018-01-03 | Stop reason: HOSPADM

## 2017-12-31 RX ORDER — MAGNESIUM SULFATE HEPTAHYDRATE 40 MG/ML
2 INJECTION, SOLUTION INTRAVENOUS ONCE
Status: COMPLETED | OUTPATIENT
Start: 2017-12-31 | End: 2017-12-31

## 2017-12-31 RX ORDER — POTASSIUM CHLORIDE 20 MEQ/1
20 TABLET, EXTENDED RELEASE ORAL 2 TIMES DAILY
Status: DISCONTINUED | OUTPATIENT
Start: 2017-12-31 | End: 2017-12-31

## 2017-12-31 RX ORDER — ONDANSETRON 2 MG/ML
4 INJECTION INTRAMUSCULAR; INTRAVENOUS ONCE
Status: COMPLETED | OUTPATIENT
Start: 2017-12-31 | End: 2017-12-31

## 2017-12-31 RX ORDER — DEXTROSE MONOHYDRATE, SODIUM CHLORIDE, AND POTASSIUM CHLORIDE 50; 1.49; 4.5 G/1000ML; G/1000ML; G/1000ML
INJECTION, SOLUTION INTRAVENOUS CONTINUOUS
Status: DISCONTINUED | OUTPATIENT
Start: 2017-12-31 | End: 2017-12-31

## 2017-12-31 RX ORDER — INSULIN ASPART 100 [IU]/ML
1-10 INJECTION, SOLUTION INTRAVENOUS; SUBCUTANEOUS
Status: DISCONTINUED | OUTPATIENT
Start: 2018-01-01 | End: 2018-01-03 | Stop reason: HOSPADM

## 2017-12-31 RX ADMIN — PANTOPRAZOLE SODIUM 40 MG: 40 TABLET, DELAYED RELEASE ORAL at 10:12

## 2017-12-31 RX ADMIN — INSULIN DETEMIR 10 UNITS: 100 INJECTION, SOLUTION SUBCUTANEOUS at 09:12

## 2017-12-31 RX ADMIN — PANTOPRAZOLE SODIUM 40 MG: 40 TABLET, DELAYED RELEASE ORAL at 09:12

## 2017-12-31 RX ADMIN — INSULIN ASPART 8 UNITS: 100 INJECTION, SOLUTION INTRAVENOUS; SUBCUTANEOUS at 03:12

## 2017-12-31 RX ADMIN — ONDANSETRON 4 MG: 2 INJECTION INTRAMUSCULAR; INTRAVENOUS at 04:12

## 2017-12-31 RX ADMIN — INSULIN ASPART 3 UNITS: 100 INJECTION, SOLUTION INTRAVENOUS; SUBCUTANEOUS at 09:12

## 2017-12-31 RX ADMIN — CIPROFLOXACIN 400 MG: 2 INJECTION, SOLUTION INTRAVENOUS at 10:12

## 2017-12-31 RX ADMIN — INSULIN ASPART 6 UNITS: 100 INJECTION, SOLUTION INTRAVENOUS; SUBCUTANEOUS at 09:12

## 2017-12-31 RX ADMIN — POTASSIUM CHLORIDE 20 MEQ: 20 TABLET, EXTENDED RELEASE ORAL at 10:12

## 2017-12-31 RX ADMIN — ONDANSETRON 4 MG: 2 INJECTION INTRAMUSCULAR; INTRAVENOUS at 02:12

## 2017-12-31 RX ADMIN — DEXTROSE MONOHYDRATE, SODIUM CHLORIDE, AND POTASSIUM CHLORIDE: 50; 4.5; 1.49 INJECTION, SOLUTION INTRAVENOUS at 06:12

## 2017-12-31 RX ADMIN — INSULIN ASPART 4 UNITS: 100 INJECTION, SOLUTION INTRAVENOUS; SUBCUTANEOUS at 06:12

## 2017-12-31 RX ADMIN — SODIUM CHLORIDE: 0.45 INJECTION, SOLUTION INTRAVENOUS at 09:12

## 2017-12-31 RX ADMIN — MAGNESIUM SULFATE IN WATER 2 G: 40 INJECTION, SOLUTION INTRAVENOUS at 09:12

## 2017-12-31 RX ADMIN — CEFEPIME 1 G: 1 INJECTION, POWDER, FOR SOLUTION INTRAMUSCULAR; INTRAVENOUS at 10:12

## 2017-12-31 RX ADMIN — INSULIN ASPART 4 UNITS: 100 INJECTION, SOLUTION INTRAVENOUS; SUBCUTANEOUS at 01:12

## 2017-12-31 RX ADMIN — ALTEPLASE 2 MG: 2.2 INJECTION, POWDER, LYOPHILIZED, FOR SOLUTION INTRAVENOUS at 09:12

## 2017-12-31 NOTE — PLAN OF CARE
Dr Oleary notified of h and h at 1500, orders noted to transfer to floor, asked to change accucheck to ac and hs with sliding scale

## 2017-12-31 NOTE — PROGRESS NOTES
Progress Note  [facility]    Admit Date: 12/29/2017   LOS: 2 days     SUBJECTIVE:     Follow-up For:  NARGIS on CKD    Review of Systems:  More awake, alert and oriented (back to baseline), she is frustrated because she is hungry but when she tries to eat, she get nauseated.  Off levophed infusion, BP on low side.  UOP 2.7 ml/24 hrs    OBJECTIVE:     Vital Signs (Most Recent)  Temp: 98.2 °F (36.8 °C) (12/31/17 0715)  Pulse: 98 (12/31/17 1400)  Resp: 15 (12/31/17 1400)  BP: (!) 100/55 (12/31/17 1400)  SpO2: (!) 92 % (12/31/17 1400)    Vital Signs Range (Last 24H):  Temp:  [98.2 °F (36.8 °C)-98.5 °F (36.9 °C)]   Pulse:  []   Resp:  [13-39]   BP: ()/(40-86)   SpO2:  [92 %-100 %]     I & O (Last 24H):    Intake/Output Summary (Last 24 hours) at 12/31/17 1455  Last data filed at 12/31/17 0600   Gross per 24 hour   Intake          4310.72 ml   Output             1720 ml   Net          2590.72 ml     Physical Exam:  Poorly groomed   NAD  No JVD  No murmurs or gallops  Lungs decrease soft +BS  Soft BS + tenderness on palpation  Ext: no edema + petechia     Laboratory:  CBC:    Recent Labs  Lab 12/31/17  0414   WBC 1.70*   RBC 2.32*   HGB 7.3*   HCT 22.9*   PLT 59*     BMP:    Recent Labs  Lab 12/31/17  0414   *   K 3.3*      CO2 30*   BUN 32*   CREATININE 2.1*   CALCIUM 6.3*          ASSESSMENT/PLAN:       A 66 y/o female with PMHx of HTN, HLD, COPD on oxygen, obesity, DM presents to OSH with AMS/encephalopathy and diarrhea. She was diagnosed with stage 3 NARGIS, anemia, thrombocytopenia and campylobacter diarrhea.       1- stage 3 NARGIS etiology is unclear but differential diagnosis should include   -- TTP/HUS secondary to campylobacter colitis  -- ATN d/t hypotension  -- AIN drug induced Levaquin vs infection      2- severe metabolic acidosis completed 24 hr bicarb infusion     3- encephalopathy has resolved    4- CKD d/t multiple myeloma was on decadron     5- Pancytopenia      Plan:      --  discontinue IVF because of volume overload     -- monitor UOP     -- give lasix 40 mg IV PRN if she gets volume overload     -- she might need a stress dose of hydrocortisone (on decadron for MM outpatient)     -- continue with levophed infusion to maintain a MAP > 65    -- continue with IV cefepime and cipro    Radha Sheldon MD  [facility]

## 2017-12-31 NOTE — PLAN OF CARE
Notified Dr. Fitzgerald that patient is nauseated and vomiting. Pt is diaphoretic but blood sugar was 331 and she was just covered with 4units of insulin. Temp 98.4. Informed MD that patient is also complaining of chest discomfort. Dr. Fitzgerald to come assess patient.

## 2017-12-31 NOTE — SUBJECTIVE & OBJECTIVE
Interval History: The patient is feeling better than last night, was gagging felt nauseated, did not vomit. Given zofran which helped a lot. She also mentioned acid reflux in the past.     Review of Systems   Constitutional: Negative for chills and fever.   HENT: Negative for trouble swallowing.    Respiratory: Negative for apnea, cough, choking, chest tightness, shortness of breath and wheezing.    Cardiovascular: Negative for chest pain, palpitations and leg swelling.   Gastrointestinal: Negative for abdominal distention, abdominal pain, constipation, diarrhea, nausea and vomiting.   Genitourinary: Negative for difficulty urinating.     Objective:     Vital Signs (Most Recent):  Temp: 98.2 °F (36.8 °C) (12/31/17 0715)  Pulse: 71 (12/31/17 0800)  Resp: 16 (12/31/17 0800)  BP: (!) 96/48 (12/31/17 0630)  SpO2: 98 % (12/31/17 0800) Vital Signs (24h Range):  Temp:  [98 °F (36.7 °C)-98.5 °F (36.9 °C)] 98.2 °F (36.8 °C)  Pulse:  [] 71  Resp:  [11-39] 16  SpO2:  [96 %-100 %] 98 %  BP: ()/(42-86) 96/48     Weight: 100.6 kg (221 lb 12.5 oz)  Body mass index is 34.74 kg/m².    Intake/Output Summary (Last 24 hours) at 12/31/17 0907  Last data filed at 12/31/17 0600   Gross per 24 hour   Intake          5460.72 ml   Output             2495 ml   Net          2965.72 ml      Physical Exam   Constitutional: She is oriented to person, place, and time. She appears well-developed and well-nourished. No distress.   HENT:   Head: Normocephalic and atraumatic.   Nose: Nose normal.   Eyes: Conjunctivae and EOM are normal. Right eye exhibits no discharge. Left eye exhibits no discharge. No scleral icterus.   Neck: Normal range of motion. Neck supple. No JVD present.   Cardiovascular: Normal rate, regular rhythm, normal heart sounds and intact distal pulses.  Exam reveals no gallop and no friction rub.    No murmur heard.  Pulmonary/Chest: Effort normal and breath sounds normal. No respiratory distress. She has no wheezes. She  has no rales. She exhibits no tenderness.   Abdominal: Soft. Bowel sounds are normal. She exhibits distension. She exhibits no mass. There is no tenderness. There is no rebound and no guarding.   Musculoskeletal: Normal range of motion. She exhibits no edema, tenderness or deformity.   Left heel tenderness   Neurological: She is alert and oriented to person, place, and time.   Skin: Skin is warm. No rash noted. She is not diaphoretic. No erythema. No pallor.   Psychiatric: She has a normal mood and affect. Her behavior is normal. Judgment and thought content normal.   Nursing note and vitals reviewed.      Significant Labs:  Recent Labs      12/29/17   0336  12/30/17   0455  12/31/17   0414   WBC  6.09  3.68*  1.70*   HGB  8.9*  8.4*  7.3*   HCT  28.1*  26.1*  22.9*   PLT  103*  98*  59*   MCV  99*  97  99*   RDW  15.6*  15.5*  15.1*       Recent Labs      12/30/17   0455  12/30/17   1722  12/31/17   0414   NA  149*  147*  146*   K  3.6  3.8  3.3*   CL  111*  105  104   CO2  25  30*  30*   GLU  237*  307*  334*   BUN  53*  41*  32*   CREATININE  2.8*  2.3*  2.1*   CALCIUM  6.6*  6.5*  6.3*   PROT  5.8*   --   5.4*   ALBUMIN  1.9*  1.8*  1.8*   BILITOT  0.3   --   0.3   ALKPHOS  82   --   75   AST  10   --   10   ALT  6*   --   6*   ANIONGAP  13  12  12   ESTGFRAFRICA  19*  24*  27*   EGFRNONAA  17*  21*  23*       Recent Labs      12/30/17   0455  12/30/17   1722  12/31/17   0414   MG  1.0*   --   1.0*   PHOS  2.4*  2.9  2.1*       A1c:   Lab Results   Component Value Date    HGBA1C 8.5 (H) 12/29/2017   , Last Gluc:   Recent Labs  Lab 12/30/17  1107 12/30/17  1600 12/30/17  1930 12/30/17  2354 12/31/17  0355 12/31/17  0756   POCTGLUCOSE 334* 288* 208* 331* 349* 267*       TSH:   Lab Results   Component Value Date    TSH 0.250 (L) 12/29/2017       Cardiac Enzymes  @CEJ@    [facility]    Recent Labs  Lab 12/29/17  1003   INR 1.1   APTT 25.2       UA  No results for input(s): COLORU, CLARITYU, SPECGRAV, PHUR, PROTEINUA,  GLUCOSEU, BLOODU, WBCU, RBCU, BACTERIA, MUCUS in the last 24 hours.    Invalid input(s):  BILIRUBINCON    Micro  Microbiology Results (last 7 days)     Procedure Component Value Units Date/Time    Blood culture [[id number]] Collected:  12/29/17 0438    Order Status:  Completed Specimen:  Blood Updated:  12/31/17 0812     Blood Culture, Routine No Growth to date     Blood Culture, Routine No Growth to date     Blood Culture, Routine No Growth to date    Narrative:       Site # 1    Blood culture [[id number]] Collected:  12/29/17 0453    Order Status:  Completed Specimen:  Blood Updated:  12/31/17 0812     Blood Culture, Routine No Growth to date     Blood Culture, Routine No Growth to date     Blood Culture, Routine No Growth to date    Narrative:       Site #2    Clostridium difficile EIA [[id number]] Collected:  12/30/17 1420    Order Status:  Completed Specimen:  Stool from Stool Updated:  12/30/17 2216     C. diff Antigen Negative     C difficile Toxins A+B, EIA Negative     Comment: Testing not recommended for children <24 months old.       Stool culture [[id number]] Collected:  12/30/17 1420    Order Status:  Sent Specimen:  Stool from Stool Updated:  12/30/17 1920    E. coli 0157 antigen [[id number]] Collected:  12/30/17 1420    Order Status:  No result Specimen:  Stool from Stool Updated:  12/30/17 1920    Urine culture [[id number]] Collected:  12/29/17 0239    Order Status:  Completed Specimen:  Urine from Urine, Catheterized Updated:  12/30/17 0950     Urine Culture, Routine No growth           ABG    Recent Labs  Lab 12/29/17  0302 12/29/17  1632 12/30/17  1643   PH 7.193* 7.247* 7.472*   PCO2 38.0 37.2 42.8   PO2 111* 95 97   HCO3 14.6* 16.2* 31.3*   POCSATURATED 97 96 98   BE -14 -11 8               Significant Imaging  Imaging Results          X-Ray Chest 1 View (Final result)  Result time 12/30/17 09:47:52    Final result by Cal Howell MD (12/30/17 09:47:52)                 Impression:     No  significant radiographic change                                       X-Ray Chest 1 View (Final result)  Result time 12/29/17 11:06:02    Final result by Juan Miguel Cabral MD (12/29/17 11:06:02)                 Impression:         Increased perihilar lung opacities, possible pulmonary edema atelectasis, and/or infiltrate.  Short-term radiographic followup could be performed to ensure resolution.                                      US Retroperitoneal Complete (Final result)  Result time 12/29/17 10:34:38    Final result by Juan Miguel Cabral MD (12/29/17 10:34:38)                 Impression:        Renal cortical thinning.  No hydronephrosis or renal masses.

## 2017-12-31 NOTE — PROGRESS NOTES
[facility] Hospital Medicine  Progress Note    Patient Name: Sanam Bocanegra  MRN: [id number]  Patient Class: IP- Inpatient   Admission Date: 12/29/2017  Length of Stay: 2 days  Attending Physician: Roc Luz MD  Primary Care Provider: Primary Doctor No        Subjective:     Principal Problem:Septic shock    HPI:  66 y/o white female with PMH of HTN, HLD, CAD, COPD (home O2 2L PRN), DM2, anxiety with depression presented to outside hospital for AMS x 1 day. Per review of outside paper notes the daughter-in-law noticed she's been altered since yesterday and has progressively worsened. Patient reports that she has chronic diarrhea for many years but it worsened recently. She does state that she has recently seen blood in her stool. She denies sick contacts or others at home with similar symptoms. She is able to answer questions when repeatedly prompted, however she remains altered and oriented only to self.     Hospital Course:  12/29: Upon further review in the AM after admit,  mentions pt has a history of leukemia.  He is unsure of further details.  She receives her care often at [facility].      Contact [facility] & having nursing supervisor fax over medical records to ICU. Talked to Nephro and having urine labs ordered stat and Renal Ultrasound stat to evaluate for cause of NARGIS.  Pt's kidneys improved significantly on HOD#1 as pt put out > 5.2 liters of fluid in 1st 24 hours.    Interval History: The patient is feeling better than last night, was gagging felt nauseated, did not vomit. Given zofran which helped a lot. She also mentioned acid reflux in the past.     Review of Systems   Constitutional: Negative for chills and fever.   HENT: Negative for trouble swallowing.    Respiratory: Negative for apnea, cough, choking, chest tightness, shortness of breath and wheezing.    Cardiovascular: Negative for chest pain, palpitations and leg swelling.   Gastrointestinal: Negative for abdominal  distention, abdominal pain, constipation, diarrhea, nausea and vomiting.   Genitourinary: Negative for difficulty urinating.     Objective:     Vital Signs (Most Recent):  Temp: 98.2 °F (36.8 °C) (12/31/17 0715)  Pulse: 71 (12/31/17 0800)  Resp: 16 (12/31/17 0800)  BP: (!) 96/48 (12/31/17 0630)  SpO2: 98 % (12/31/17 0800) Vital Signs (24h Range):  Temp:  [98 °F (36.7 °C)-98.5 °F (36.9 °C)] 98.2 °F (36.8 °C)  Pulse:  [] 71  Resp:  [11-39] 16  SpO2:  [96 %-100 %] 98 %  BP: ()/(42-86) 96/48     Weight: 100.6 kg (221 lb 12.5 oz)  Body mass index is 34.74 kg/m².    Intake/Output Summary (Last 24 hours) at 12/31/17 0907  Last data filed at 12/31/17 0600   Gross per 24 hour   Intake          5460.72 ml   Output             2495 ml   Net          2965.72 ml      Physical Exam   Constitutional: She is oriented to person, place, and time. She appears well-developed and well-nourished. No distress.   HENT:   Head: Normocephalic and atraumatic.   Nose: Nose normal.   Eyes: Conjunctivae and EOM are normal. Right eye exhibits no discharge. Left eye exhibits no discharge. No scleral icterus.   Neck: Normal range of motion. Neck supple. No JVD present.   Cardiovascular: Normal rate, regular rhythm, normal heart sounds and intact distal pulses.  Exam reveals no gallop and no friction rub.    No murmur heard.  Pulmonary/Chest: Effort normal and breath sounds normal. No respiratory distress. She has no wheezes. She has no rales. She exhibits no tenderness.   Abdominal: Soft. Bowel sounds are normal. She exhibits distension. She exhibits no mass. There is no tenderness. There is no rebound and no guarding.   Musculoskeletal: Normal range of motion. She exhibits no edema, tenderness or deformity.   Left heel tenderness   Neurological: She is alert and oriented to person, place, and time.   Skin: Skin is warm. No rash noted. She is not diaphoretic. No erythema. No pallor.   Psychiatric: She has a normal mood and affect. Her  behavior is normal. Judgment and thought content normal.   Nursing note and vitals reviewed.      Significant Labs:  Recent Labs      12/29/17   0336  12/30/17   0455  12/31/17   0414   WBC  6.09  3.68*  1.70*   HGB  8.9*  8.4*  7.3*   HCT  28.1*  26.1*  22.9*   PLT  103*  98*  59*   MCV  99*  97  99*   RDW  15.6*  15.5*  15.1*       Recent Labs      12/30/17   0455  12/30/17   1722  12/31/17   0414   NA  149*  147*  146*   K  3.6  3.8  3.3*   CL  111*  105  104   CO2  25  30*  30*   GLU  237*  307*  334*   BUN  53*  41*  32*   CREATININE  2.8*  2.3*  2.1*   CALCIUM  6.6*  6.5*  6.3*   PROT  5.8*   --   5.4*   ALBUMIN  1.9*  1.8*  1.8*   BILITOT  0.3   --   0.3   ALKPHOS  82   --   75   AST  10   --   10   ALT  6*   --   6*   ANIONGAP  13  12  12   ESTGFRAFRICA  19*  24*  27*   EGFRNONAA  17*  21*  23*       Recent Labs      12/30/17   0455  12/30/17   1722  12/31/17   0414   MG  1.0*   --   1.0*   PHOS  2.4*  2.9  2.1*       A1c:   Lab Results   Component Value Date    HGBA1C 8.5 (H) 12/29/2017   , Last Gluc:   Recent Labs  Lab 12/30/17  1107 12/30/17  1600 12/30/17  1930 12/30/17  2354 12/31/17  0355 12/31/17  0756   POCTGLUCOSE 334* 288* 208* 331* 349* 267*       TSH:   Lab Results   Component Value Date    TSH 0.250 (L) 12/29/2017       Cardiac Enzymes  @CEJ@    [facility]    Recent Labs  Lab 12/29/17  1003   INR 1.1   APTT 25.2       UA  No results for input(s): COLORU, CLARITYU, SPECGRAV, PHUR, PROTEINUA, GLUCOSEU, BLOODU, WBCU, RBCU, BACTERIA, MUCUS in the last 24 hours.    Invalid input(s):  SafetyTat    Micro  Microbiology Results (last 7 days)     Procedure Component Value Units Date/Time    Blood culture [[id number]] Collected:  12/29/17 0438    Order Status:  Completed Specimen:  Blood Updated:  12/31/17 0812     Blood Culture, Routine No Growth to date     Blood Culture, Routine No Growth to date     Blood Culture, Routine No Growth to date    Narrative:       Site # 1    Blood culture [[id number]]  Collected:  12/29/17 0453    Order Status:  Completed Specimen:  Blood Updated:  12/31/17 0812     Blood Culture, Routine No Growth to date     Blood Culture, Routine No Growth to date     Blood Culture, Routine No Growth to date    Narrative:       Site #2    Clostridium difficile EIA [[id number]] Collected:  12/30/17 1420    Order Status:  Completed Specimen:  Stool from Stool Updated:  12/30/17 2216     C. diff Antigen Negative     C difficile Toxins A+B, EIA Negative     Comment: Testing not recommended for children <24 months old.       Stool culture [[id number]] Collected:  12/30/17 1420    Order Status:  Sent Specimen:  Stool from Stool Updated:  12/30/17 1920    E. coli 0157 antigen [[id number]] Collected:  12/30/17 1420    Order Status:  No result Specimen:  Stool from Stool Updated:  12/30/17 1920    Urine culture [[id number]] Collected:  12/29/17 0239    Order Status:  Completed Specimen:  Urine from Urine, Catheterized Updated:  12/30/17 0950     Urine Culture, Routine No growth           ABG    Recent Labs  Lab 12/29/17  0302 12/29/17  1632 12/30/17  1643   PH 7.193* 7.247* 7.472*   PCO2 38.0 37.2 42.8   PO2 111* 95 97   HCO3 14.6* 16.2* 31.3*   POCSATURATED 97 96 98   BE -14 -11 8               Significant Imaging  Imaging Results          X-Ray Chest 1 View (Final result)  Result time 12/30/17 09:47:52    Final result by Cal Howell MD (12/30/17 09:47:52)                 Impression:     No significant radiographic change                                       X-Ray Chest 1 View (Final result)  Result time 12/29/17 11:06:02    Final result by Juan Miguel Cabral MD (12/29/17 11:06:02)                 Impression:         Increased perihilar lung opacities, possible pulmonary edema atelectasis, and/or infiltrate.  Short-term radiographic followup could be performed to ensure resolution.                                      US Retroperitoneal Complete (Final result)  Result time 12/29/17 10:34:38    Final  result by Juan Miguel Cabral MD (12/29/17 10:34:38)                 Impression:        Renal cortical thinning.  No hydronephrosis or renal masses.                                                 Assessment/Plan:         AMS  Improved  Potentially 2/2 uremic encephalopathy that is clearing  CT head neg for acute process, only showing chronic right mastoiditis  WBC down to 3.68 from 6.09 (hx of blood cancer, likely MM)  Afebrile, LA 1.9, repeat was 0.7  Urine studies demonstrate mixed pre-renal with intrinsic renal disease; nephrology assisting  Metabolic acidosis improved through day with D5W w/ 3 amps bicarb  AMS improved     ARF  baseline is 20/0.8  Much improved, Nephro- [facility] assisting   -Stage 3 NARGIS w/ unclear etiology    -ATN 2/2 hypotension   -AIN drug induced (Levaquin at outside hospital  Vs infection)    Dm2 non-insulin dependent  BS this   Increase detemir to 10 BID from 3 daily.       Septic Shock with Hx of leukemia  Concern for sepsis with a confirmed source (Campylobacter), cannot trust lack of fever or WBC # because of hx of leukemia  Hypotensive at time of presentation and on arrival to [facility] -improved on levophed [0.06 this AM]  Arrived on Levofed, will continue and titrate PRN for MAP >65  Already received Rocephin and Levaquin, now giving Broad spectrum abx (Cefepime, Cipro for Camploybacter)  UA concerning for UTI at OSH, but improved here (clear yellow, no signs of UTI)  BCx's and UC'x obtained at outside hospital  [facility], will check with ID on how many days abxs is needed     Campylobacter diarrhea  Stool positive for Campylobacter at OSH  Already received Levaquin  Receiving Cipro  Stool studies with ova, parasites pending (no BM while in-patient)     Anemia  Likely 2/2 leukemia- pancytopenia vs dilutional. Pt is asymptomatic.  Monitor, will trend H/H this afternoon and possibly transfuse.     Diet: NPO (pending speech eval)  Ppx: SCD's  On GI prophylaxis.     Dispo: Will wean off levophed,  monitor Bp, f/u Id, monitor blood lines,  Basal regimen increased to 10 BID, BS needs tight control.      VTE Risk Mitigation         Ordered     Medium Risk of VTE  Once      12/29/17 0226     Place sequential compression device  Until discontinued      12/29/17 0226          Critical care time spent on the evaluation and treatment of severe organ dysfunction, review of pertinent labs and imaging studies, discussions with consulting providers and discussions with patient/family: 45 minutes.    Edmar Fitzgerald MD  Department of Hospital Medicine   [facility]

## 2018-01-01 LAB
ALBUMIN SERPL BCP-MCNC: 1.9 G/DL
ALP SERPL-CCNC: 70 U/L
ALT SERPL W/O P-5'-P-CCNC: 7 U/L
ANION GAP SERPL CALC-SCNC: 11 MMOL/L
ANISOCYTOSIS BLD QL SMEAR: SLIGHT
AST SERPL-CCNC: 9 U/L
BASOPHILS # BLD AUTO: ABNORMAL K/UL
BASOPHILS NFR BLD: 0 %
BILIRUB SERPL-MCNC: 0.3 MG/DL
BUN SERPL-MCNC: 22 MG/DL
CALCIUM SERPL-MCNC: 6.6 MG/DL
CHLORIDE SERPL-SCNC: 105 MMOL/L
CO2 SERPL-SCNC: 29 MMOL/L
CREAT SERPL-MCNC: 1.9 MG/DL
DIFFERENTIAL METHOD: ABNORMAL
EOSINOPHIL # BLD AUTO: ABNORMAL K/UL
EOSINOPHIL NFR BLD: 8 %
ERYTHROCYTE [DISTWIDTH] IN BLOOD BY AUTOMATED COUNT: 14.4 %
EST. GFR  (AFRICAN AMERICAN): 31 ML/MIN/1.73 M^2
EST. GFR  (NON AFRICAN AMERICAN): 27 ML/MIN/1.73 M^2
GLUCOSE SERPL-MCNC: 263 MG/DL
HCT VFR BLD AUTO: 22.8 %
HGB BLD-MCNC: 7.2 G/DL
HYPOCHROMIA BLD QL SMEAR: ABNORMAL
LYMPHOCYTES # BLD AUTO: ABNORMAL K/UL
LYMPHOCYTES NFR BLD: 37 %
MAGNESIUM SERPL-MCNC: 1.1 MG/DL
MCH RBC QN AUTO: 31 PG
MCHC RBC AUTO-ENTMCNC: 31.6 G/DL
MCV RBC AUTO: 98 FL
METAMYELOCYTES NFR BLD MANUAL: 1 %
MONOCYTES # BLD AUTO: ABNORMAL K/UL
MONOCYTES NFR BLD: 12 %
NEUTROPHILS NFR BLD: 37 %
NEUTS BAND NFR BLD MANUAL: 5 %
PHOSPHATE SERPL-MCNC: 2.6 MG/DL
PLATELET # BLD AUTO: 56 K/UL
PLATELET BLD QL SMEAR: ABNORMAL
PMV BLD AUTO: 12.1 FL
POCT GLUCOSE: 233 MG/DL (ref 70–110)
POCT GLUCOSE: 241 MG/DL (ref 70–110)
POCT GLUCOSE: 288 MG/DL (ref 70–110)
POCT GLUCOSE: 305 MG/DL (ref 70–110)
POTASSIUM SERPL-SCNC: 3.3 MMOL/L
PROT SERPL-MCNC: 5.5 G/DL
RBC # BLD AUTO: 2.32 M/UL
SODIUM SERPL-SCNC: 145 MMOL/L
VANCOMYCIN SERPL-MCNC: 4.2 UG/ML
WBC # BLD AUTO: 1.33 K/UL

## 2018-01-01 PROCEDURE — 25000003 PHARM REV CODE 250: Performed by: STUDENT IN AN ORGANIZED HEALTH CARE EDUCATION/TRAINING PROGRAM

## 2018-01-01 PROCEDURE — 97166 OT EVAL MOD COMPLEX 45 MIN: CPT

## 2018-01-01 PROCEDURE — 85007 BL SMEAR W/DIFF WBC COUNT: CPT

## 2018-01-01 PROCEDURE — 84100 ASSAY OF PHOSPHORUS: CPT

## 2018-01-01 PROCEDURE — 63600175 PHARM REV CODE 636 W HCPCS: Performed by: FAMILY MEDICINE

## 2018-01-01 PROCEDURE — 83735 ASSAY OF MAGNESIUM: CPT

## 2018-01-01 PROCEDURE — 97535 SELF CARE MNGMENT TRAINING: CPT

## 2018-01-01 PROCEDURE — 63600175 PHARM REV CODE 636 W HCPCS

## 2018-01-01 PROCEDURE — 11000001 HC ACUTE MED/SURG PRIVATE ROOM

## 2018-01-01 PROCEDURE — 27000221 HC OXYGEN, UP TO 24 HOURS

## 2018-01-01 PROCEDURE — 94761 N-INVAS EAR/PLS OXIMETRY MLT: CPT

## 2018-01-01 PROCEDURE — 25000003 PHARM REV CODE 250

## 2018-01-01 PROCEDURE — 92526 ORAL FUNCTION THERAPY: CPT

## 2018-01-01 PROCEDURE — 92507 TX SP LANG VOICE COMM INDIV: CPT

## 2018-01-01 PROCEDURE — 80202 ASSAY OF VANCOMYCIN: CPT

## 2018-01-01 PROCEDURE — 80053 COMPREHEN METABOLIC PANEL: CPT

## 2018-01-01 PROCEDURE — 85027 COMPLETE CBC AUTOMATED: CPT

## 2018-01-01 RX ORDER — CIPROFLOXACIN 2 MG/ML
400 INJECTION, SOLUTION INTRAVENOUS
Status: DISCONTINUED | OUTPATIENT
Start: 2018-01-02 | End: 2018-01-02

## 2018-01-01 RX ORDER — POTASSIUM CHLORIDE 7.45 MG/ML
10 INJECTION INTRAVENOUS ONCE
Status: COMPLETED | OUTPATIENT
Start: 2018-01-01 | End: 2018-01-01

## 2018-01-01 RX ORDER — AZITHROMYCIN 250 MG/1
500 TABLET, FILM COATED ORAL DAILY
Status: DISCONTINUED | OUTPATIENT
Start: 2018-01-01 | End: 2018-01-03 | Stop reason: HOSPADM

## 2018-01-01 RX ORDER — CIPROFLOXACIN 2 MG/ML
400 INJECTION, SOLUTION INTRAVENOUS
Status: DISCONTINUED | OUTPATIENT
Start: 2018-01-01 | End: 2018-01-01 | Stop reason: DRUGHIGH

## 2018-01-01 RX ADMIN — MAGNESIUM SULFATE HEPTAHYDRATE 3 G: 500 INJECTION, SOLUTION INTRAMUSCULAR; INTRAVENOUS at 11:01

## 2018-01-01 RX ADMIN — CEFEPIME 1 G: 1 INJECTION, POWDER, FOR SOLUTION INTRAMUSCULAR; INTRAVENOUS at 08:01

## 2018-01-01 RX ADMIN — SODIUM PHOSPHATE, MONOBASIC, MONOHYDRATE AND SODIUM PHOSPHATE, DIBASIC, ANHYDROUS 20.01 MMOL: 276; 142 INJECTION, SOLUTION INTRAVENOUS at 12:01

## 2018-01-01 RX ADMIN — INSULIN ASPART 6 UNITS: 100 INJECTION, SOLUTION INTRAVENOUS; SUBCUTANEOUS at 05:01

## 2018-01-01 RX ADMIN — PANTOPRAZOLE SODIUM 40 MG: 40 TABLET, DELAYED RELEASE ORAL at 08:01

## 2018-01-01 RX ADMIN — CIPROFLOXACIN 400 MG: 2 INJECTION, SOLUTION INTRAVENOUS at 12:01

## 2018-01-01 RX ADMIN — CIPROFLOXACIN 400 MG: 2 INJECTION, SOLUTION INTRAVENOUS at 11:01

## 2018-01-01 RX ADMIN — INSULIN ASPART 2 UNITS: 100 INJECTION, SOLUTION INTRAVENOUS; SUBCUTANEOUS at 09:01

## 2018-01-01 RX ADMIN — INSULIN ASPART 4 UNITS: 100 INJECTION, SOLUTION INTRAVENOUS; SUBCUTANEOUS at 08:01

## 2018-01-01 RX ADMIN — AZITHROMYCIN 500 MG: 250 TABLET, FILM COATED ORAL at 03:01

## 2018-01-01 RX ADMIN — PANTOPRAZOLE SODIUM 40 MG: 40 TABLET, DELAYED RELEASE ORAL at 09:01

## 2018-01-01 RX ADMIN — POTASSIUM CHLORIDE 10 MEQ: 10 INJECTION, SOLUTION INTRAVENOUS at 11:01

## 2018-01-01 NOTE — PROGRESS NOTES
Progress Note  [facility]    Admit Date: 12/29/2017   LOS: 3 days     SUBJECTIVE:     Follow-up For:  NARGIS on CKD    Review of Systems:  She feels better this afternoon  BP is better now   ml/24 hrs    OBJECTIVE:     Vital Signs (Most Recent)  Temp: 98.3 °F (36.8 °C) (01/01/18 1158)  Pulse: 73 (01/01/18 1158)  Resp: 19 (01/01/18 1158)  BP: (!) 125/58 (01/01/18 1158)  SpO2: (!) 94 % (01/01/18 1545)    Vital Signs Range (Last 24H):  Temp:  [97.9 °F (36.6 °C)-98.8 °F (37.1 °C)]   Pulse:  [66-95]   Resp:  [13-22]   BP: ()/(46-59)   SpO2:  [92 %-100 %]     I & O (Last 24H):    Intake/Output Summary (Last 24 hours) at 01/01/18 1557  Last data filed at 01/01/18 0056   Gross per 24 hour   Intake              300 ml   Output              330 ml   Net              -30 ml     Physical Exam:  Poorly groomed   NAD  No JVD  No murmurs or gallops  Lungs decrease soft +BS  Soft BS + tenderness on palpation  Ext: no edema + petechia     Laboratory:  CBC:    Recent Labs  Lab 01/01/18  0520   WBC 1.33*   RBC 2.32*   HGB 7.2*   HCT 22.8*   PLT 56*     BMP:    Recent Labs  Lab 01/01/18  0520      K 3.3*      CO2 29   BUN 22   CREATININE 1.9*   CALCIUM 6.6*          ASSESSMENT/PLAN:       A 68 y/o female with PMHx of HTN, HLD, COPD on oxygen, obesity, DM presents to OSH with AMS/encephalopathy and diarrhea. She was diagnosed with stage 3 NARGIS, anemia, thrombocytopenia and campylobacter diarrhea.       1- stage 3 NARGIS etiology is unclear but differential diagnosis should include   -- TTP/HUS secondary to campylobacter colitis  -- ATN d/t hypotension  -- AIN drug induced Levaquin vs infection      2- severe metabolic acidosis completed 24 hr bicarb infusion     3- encephalopathy has resolved    4- CKD d/t multiple myeloma was on decadron     5- Pancytopenia      6- hypomagnesemia     7- hypokalemia    Plan:      -- recovery phase ATN     -- monitor UOP      -- she might need a stress dose of hydrocortisone (on  decadron for MM outpatient)    -- continue with IV azithromycin, cefepime and cipro    Radha Sheldon MD  [facility]

## 2018-01-01 NOTE — PROGRESS NOTES
"  [facility]  Adult Nutrition  Consult Note    SUMMARY     Recommendations    Recommendation/Intervention:   1. Rec pureed diet per SLP   2. Rec boost glucose with meals   3. Consider probiotics to aid with diarrhea  4. RD to monitor    Goals: Diet or TF will be started within 48 hours  Nutrition Goal Status: goal met  Communication of RD Recs: reviewed with RN    Continuum of Care Plan    Referral to Outpatient Services:  (d/c diet to be determined)    Reason for Assessment    Reason for Assessment: RD follow-up  Diagnosis:  (ARF)  Relevent Medical History: anxiety, HTN, HLD, CAD, COPD, DM      General Information Comments: SLP rec pureed with thin liquids. Patient asleep at time of visit, but nsg noted 0-25% po intake with meals.  Stage II and SDT x 2 wounds. Patient appears obese.     Nutrition Prescription Ordered    Current Diet Order: 1800 ADA      Evaluation of Received Nutrients/Fluid Intake    Energy Calories Required: not meeting needs   Protein Required: not meeting needs      Fluid Required:  (per MD (NARGIS))     Tolerance: tolerating (small amounts)    % Intake of Estimated Energy Needs:0-25%  % Meal Intake: 0-25%    Nutrition/Diet History     Food Preferences: no Taoism or cultural food prefs identified   Factors Affecting Nutritional Intake: diarrhea     Labs/Tests/Procedures/Meds     Pertinent Labs Reviewed: reviewed  Pertinent Labs Comments: Phos 2.6; K 3.3; Cr 1.9; GFR 27  Pertinent Medications Reviewed: reviewed     Physical Findings    Overall Physical Appearance: weak  Tubes:  (none)  Oral/Mouth Cavity:  (unable to assess)  Skin:  (Stage II buttocks; SDT x 2)    Anthropometrics    Temp: 98.3 °F (36.8 °C)     Height: 5' 7" (170.2 cm)  Weight Method: Bed Scale  Weight: 100.6 kg (221 lb 12.5 oz)     Ideal Body Weight (IBW), Female: 135 lb     % Ideal Body Weight, Female (lb): 159.22 lb  BMI (Calculated): 33.7  BMI Grade: 30 - 34.9- obesity - grade I     Estimated/Assessed " Needs    Weight Used For Calorie Calculations: 61.3 kg (135 lb 2.3 oz) (IBW)      Energy Calorie Requirements (kcal): 9752-9474  Energy Need Method: Kcal/kg (30-35 kcal/kg IBW)    RMR (Harrison-St. Jeor Equation): 1170.62      Weight Used For Protein Calculations: 61.3 kg (135 lb 2.3 oz)  Protein Requirements: 75-92g     Fluid Need Method: RDA Method      RDA Method (mL): 1839      Assessment and Plan    Nutrition dx: Inadequate Energy Intake r/t decreased appetite as evidenced by: 25% po intake with meals  Nutrition Dx Status; new      Monitor and Evaluation    Food and Nutrient Intake: food and beverage intake  Food and Nutrient Adminstration: diet order     Physical Activity and Function: nutrition-related ADLs and IADLs  Anthropometric Measurements: weight  Biochemical Data, Medical Tests and Procedures: electrolyte and renal panel  Nutrition-Focused Physical Findings: overall appearance    Nutrition Risk    Level of Risk:  (2xweekly)    Nutrition Follow-Up    RD Follow-up?: Yes

## 2018-01-01 NOTE — PROGRESS NOTES
Progress Note  [facility] PRACT[facility]  Admit Date: 12/29/2017   LOS: 3 days   SUBJECTIVE:   Follow-up For: septic shock    Patient seen and examined this AM.  Afebrile over night. Denies pain reports feeling much better.  No diarrhea for a few days now.      ROS   Denies diarrhea, abd pain, leg numbness, fever, chills, n/v    OBJECTIVE:   Vital Signs (Most Recent)  Temp: 98.3 °F (36.8 °C) (01/01/18 0747)  Pulse: 80 (01/01/18 0747)  Resp: 19 (01/01/18 0747)  BP: (!) 117/59 (01/01/18 0747)  SpO2: (!) 93 % (01/01/18 0804)    I & O (Last 24H):  Intake/Output Summary (Last 24 hours) at 01/01/18 1000  Last data filed at 01/01/18 0056   Gross per 24 hour   Intake              350 ml   Output              495 ml   Net             -145 ml     Wt Readings from Last 3 Encounters:   12/31/17 100.6 kg (221 lb 12.5 oz)       Current Diet Order   Procedures    Diet Diabetic 1800 Calories        Physical Exam  GEN: NAD  HEENT: MMM  CV: RRR  PULM: CTA-B  GI: S/NT/ND  EXT: no edema     Laboratory Data:  CBC    Recent Labs  Lab 12/30/17  0455 12/31/17  0414 12/31/17  1512 01/01/18  0520   WBC 3.68* 1.70*  --  1.33*   RBC 2.70* 2.32*  --  2.32*   HGB 8.4* 7.3* 7.1* 7.2*   HCT 26.1* 22.9* 22.3* 22.8*   PLT 98* 59*  --  56*   MCV 97 99*  --  98   MCH 31.1* 31.5*  --  31.0   MCHC 32.2 31.9*  --  31.6*     CMP    Recent Labs  Lab 12/30/17  0455 12/30/17  1722 12/31/17  0414 01/01/18  0520   CALCIUM 6.6* 6.5* 6.3* 6.6*   PROT 5.8*  --  5.4* 5.5*   * 147* 146* 145   K 3.6 3.8 3.3* 3.3*   CO2 25 30* 30* 29   * 105 104 105   BUN 53* 41* 32* 22   CREATININE 2.8* 2.3* 2.1* 1.9*   ALKPHOS 82  --  75 70   ALT 6*  --  6* 7*   AST 10  --  10 9*   BILITOT 0.3  --  0.3 0.3     POCT-Glucose  POCT Glucose   Date Value Ref Range Status   01/01/2018 241 (H) 70 - 110 mg/dL Final   12/31/2017 257 (H) 70 - 110 mg/dL Final   12/31/2017 274 (H) 70 - 110 mg/dL Final   12/31/2017 229 (H) 70 - 110 mg/dL Final   12/31/2017 267 (H) 70 - 110 mg/dL Final    12/31/2017 349 (H) 70 - 110 mg/dL Final   12/30/2017 331 (H) 70 - 110 mg/dL Final   12/30/2017 208 (H) 70 - 110 mg/dL Final   12/30/2017 288 (H) 70 - 110 mg/dL Final   12/30/2017 334 (H) 70 - 110 mg/dL Final   12/30/2017 243 (H) 70 - 110 mg/dL Final   12/30/2017 350 (H) 70 - 110 mg/dL Final   12/29/2017 357 (H) 70 - 110 mg/dL Final   12/29/2017 432 (H) 70 - 110 mg/dL Final   12/29/2017 411 (H) 70 - 110 mg/dL Final   12/29/2017 448 (H) 70 - 110 mg/dL Final     [facility]    Recent Labs  Lab 12/29/17  1003   INR 1.1   APTT 25.2     UA  No results for input(s): COLORU, CLARITYU, SPECGRAV, PHUR, PROTEINUA, GLUCOSEU, BLOODU, WBCU, RBCU, BACTERIA, MUCUS in the last 24 hours.    Invalid input(s):  [email or web address]  MICRO  Microbiology Results (last 7 days)     Procedure Component Value Units Date/Time    Stool culture [[id number]] Collected:  12/30/17 1420    Order Status:  Completed Specimen:  Stool from Stool Updated:  01/01/18 0934     Stool Culture No enteric rosi    Blood culture [[id number]] Collected:  12/29/17 0438    Order Status:  Completed Specimen:  Blood Updated:  01/01/18 0812     Blood Culture, Routine No Growth to date     Blood Culture, Routine No Growth to date     Blood Culture, Routine No Growth to date     Blood Culture, Routine No Growth to date    Narrative:       Site # 1    Blood culture [[id number]] Collected:  12/29/17 0453    Order Status:  Completed Specimen:  Blood Updated:  01/01/18 0812     Blood Culture, Routine No Growth to date     Blood Culture, Routine No Growth to date     Blood Culture, Routine No Growth to date     Blood Culture, Routine No Growth to date    Narrative:       Site #2    Clostridium difficile EIA [[id number]] Collected:  12/30/17 1420    Order Status:  Completed Specimen:  Stool from Stool Updated:  12/30/17 2216     C. diff Antigen Negative     C difficile Toxins A+B, EIA Negative     Comment: Testing not recommended for children <24 months old.       E. coli 0157 antigen  [[id number]] Collected:  12/30/17 1420    Order Status:  No result Specimen:  Stool from Stool Updated:  12/30/17 1920    Urine culture [[id number]] Collected:  12/29/17 0239    Order Status:  Completed Specimen:  Urine from Urine, Catheterized Updated:  12/30/17 0950     Urine Culture, Routine No growth        LIPID PANEL  No results found for: CHOL  No results found for: HDL  No results found for: LDLCALC  No results found for: TRIG  No results found for: CHOLHDL    Diagnostic Results:  Imaging in last 24 hours:  Reviewed    ASSESSMENT/PLAN:   66 y/o white female with PMH of HTN, HLD, CAD, COPD (home O2 2L PRN), DM2, anxiety with depression presented to outside hospital for AMS found with SIRS and renal failure.      AMS  -back to baseline  -possibly secondary septic shock since she required pressors, but off pressors now     Acute renal failure  -improved BUN/Cr 22/1.9 today  -baseline 20/0.8  -already received IVF's   -possibly ATN due to ciprofloxacin/levaquin.  Was positive for granular casts on urine     Nutropenia  -WBC 1.3 today and   -has a h/o leukemia  -will consider consult heme/onc for now    H/o campylobacter diarrhea  -received levaquin at outside hospital  -initially septic requiring pressors, now BP controlled without pressors  -initial pro-wendi was 1.85  -now afebrile without SIRS criteria   -low platelets 56, no sign of acute bleeding does have a h/o leukemia  -Was evaluated for possible TTP/HUS by heme/onc but negative for TTP/HUS    Anemia  -has a h/o leukemia, looks more like pancytopenia  -h/h 7.2/22.8    Diet: renal  Ppx: SCD    Dispo: monitor renal failure, neutropenia: consider consult heme onc due to h/o leukemia    1/1/2018 Matthew To MD  10:00 AM

## 2018-01-01 NOTE — PLAN OF CARE
Problem: Occupational Therapy Goal  Goal: Occupational Therapy Goal  Goals to be met by: 1/15/2017    Patient will increase functional independence with ADLs by performing:    Grooming while seated with Supervision.  Toileting from bedside commode with Contact Guard Assistance for hygiene and clothing management.   Supine to sit with Modified [place].  Stand pivot transfers with Stand-by Assistance.  Toilet transfer to bedside commode with Stand-by Assistance.  Upper extremity exercise program x10 reps per handout, with supervision.    Outcome: Ongoing (interventions implemented as appropriate)  Pt. Would benefit from skilled OT. Recommend HHOT/HHPT.  Pt. Has all necessary DME at home.  Continue with OT POC.

## 2018-01-01 NOTE — PLAN OF CARE
Problem: SLP Goal  Goal: SLP Goal  Short Term Goals:  1. Pt will consume % of puree diet with thin liquids without overt s/s of aspiration given mod assist.   2. Pt will implement safe swallowing strategies 100% of the time during meals given mod assist.   3. Pt will consume 2/2 solids without overt s/s of aspiration given mod assist.   4. Pt will answer orientation questions with 100% accuracy given mod assist.    Outcome: Ongoing (interventions implemented as appropriate)  Pt with periods of confusion but did tolerate advanced trials of soft textures. Pt appeared to be oriented to place, time and self and then followed by confusion during ST session. Will f/u for diet tolerance and cognitive deficits.

## 2018-01-01 NOTE — PLAN OF CARE
Pt in bed sleeping with significant other at bedside. Arouses easily to verbal stimuli. Denies any pain or discomfort at this time. No acute distress noted. Pt remains on telemetry, running SR, HR 60s-80s. Pt receiving IV antibiotics via L arm PICC without difficulty. Mann intact draining clear yellow fluid. Fall precautions remain in place. Bed alarm on. Call bell in reach. Will cont to monitor and will report off to oncoming nurse.

## 2018-01-01 NOTE — PT/OT/SLP EVAL
Occupational Therapy   Evaluation/Treatment    Name: Sanam Bocanegra  MRN: [id number]  Admitting Diagnosis:  Septic shock      Recommendations:     Discharge Recommendations: home with home health, home health PT, home health OT  Discharge Equipment Recommendations:  none  Barriers to discharge:  None    History:     Occupational Profile:  Living Environment: Home with  SSH with ramp entrance; has tub.shower combo with TTB  Previous level of function: assisted with Bath/dress from ; toileting PRN; not walked since xmas but typically ambulates with RW with SBA/Sup; hx multiple falls; wc used mosltly  Roles and Routines: sedentary  Equipment Owned:  wheelchair, walker, rolling, oxygen, glucometer, bedside commode, bath bench, hospital bed (has SPC but does not use)  Assistance upon Discharge:     No past medical history on file.    No past surgical history on file.    Subjective     Chief Complaint: dizziness but stated she has hx vertigo; pt also has low H&H 7.2/22.8  Patient/Family stated goals: none  Communicated with: nurseprior to session.  Pain/Comfort:  · Pain Rating 1: 0/10  · Pain Rating Post-Intervention 1: 0/10    Objective:     Patient found with: bed alarm, telemetry, SCD, PICC line    General Precautions: Standard, fall, aspiration, pureed diet   Orthopedic Precautions:N/A   Braces: N/A     Occupational Performance:    Bed Mobility:    · Patient completed Rolling/Turning to Left with  stand by assistance and with side rail  · Patient completed Scooting/Bridging with stand by assistance and with side rail  · Patient completed Supine to Sit with stand by assistance and with side rail with HOB slightly elevated  · Patient completed Sit to Supine with stand by assistance and with side rail    Functional Mobility/Transfers:  · Patient completed Sit <> Stand Transfer with minimum assistance  with  no assistive device    · Side stepped to L side toward HOB with mod assist.  Complained of  Dizziness and had to sit.    Activities of Daily Living:  · Feeding:  supervision to increase intake--ate apple sauce  · Grooming: stand by assistance combed hair in bed  · LB Dressing: total assistance socks    Cognitive/Visual Perceptual:  Cognitive/Psychosocial Skills:     -       Oriented to: Person, Place and Time   -       Follows Commands/attention:Follows one-step commands  -       Communication: clear/fluent  -       Memory: Poor immediate recall  -       Safety awareness/insight to disability: impaired   -       Mood/Affect/Coping skills/emotional control: Appropriate to situation  Visual/Perceptual:      -Intact    Physical Exam:  Balance:    -       static sitting:  Good  Dynamic sitting:  Fair plus  Static standing:  Poor Plus  Dynamic standing:  poor  Postural examination/scapula alignment:    -       Rounded shoulders  Skin integrity: small lacerations B knees  Edema:  None noted  Sensation:    -       Intact  Dominant hand:    -       left  Upper Extremity Range of Motion:     -       Right Upper Extremity: WFL  -       Left Upper Extremity: WFL  Upper Extremity Strength:    -       Right Upper Extremity: WFL  -       Left Upper Extremity: WFL   Strength:    -       Right Upper Extremity: WFL  -       Left Upper Extremity: WFL    Patient left HOB elevated with all lines intact, call button in reach, bed alarm on, nurse notified and spouse present    [facility] 6 Click:  [facility] Total Score: 13    Treatment & Education:  Role of OT And POC; feeding cues importance of intake and nutrition;   Education:    Assessment:     Sanam Bocanegra is a 67 y.o. female with a medical diagnosis of Septic shock.  She presents with weakness and dizziness with OOB activity; has low H&H 7.2/22.8.  Performance deficits affecting function are weakness, impaired self care skills, impaired balance, decreased safety awareness, impaired functional mobilty, impaired endurance, gait instability, impaired cognition, decreased lower  "extremity function.  Pt. Would benefit from skilled OT. Recommend HHOT/HHPT.  Pt. Has all necessary DME at home.  Continue with OT POC.    Rehab Prognosis:  Good; patient would benefit from acute skilled OT services to address these deficits and reach maximum level of function.         Clinical Decision Makin.  OT Mod:  "Pt evaluation falls under moderate complexity for evaluation coding due to identification of 3-5 performance deficits noted as stated above. Eval required Min/Mod assistance to complete on this date and detailed assessment(s) were utilized. Moreover, an expanded review of history and occupational profile obtained with additional review of cognitive, physical and psychosocial hx."     Plan:     Patient to be seen 5 x/week to address the above listed problems via self-care/home management, therapeutic activities, therapeutic exercises  · Plan of Care Expires: 18  · Plan of Care Reviewed with: patient, spouse    This Plan of care has been discussed with the patient who was involved in its development and understands and is in agreement with the identified goals and treatment plan    GOALS:    Occupational Therapy Goals        Problem: Occupational Therapy Goal    Goal Priority Disciplines Outcome Interventions   Occupational Therapy Goal     OT, PT/OT Ongoing (interventions implemented as appropriate)    Description:  Goals to be met by: 1/15/2017    Patient will increase functional independence with ADLs by performing:    Grooming while seated with Supervision.  Toileting from bedside commode with Contact Guard Assistance for hygiene and clothing management.   Supine to sit with Modified [place].  Stand pivot transfers with Stand-by Assistance.  Toilet transfer to bedside commode with Stand-by Assistance.  Upper extremity exercise program x10 reps per handout, with supervision.                      Time Tracking:     OT Date of Treatment: 18  OT Start Time:   OT Stop Time: " 1551  OT Total Time (min): 23 min    Billable Minutes:Evaluation 15  Self Care/Home Management 8  Total Time 23    Rosie Schmidt OT  1/1/2018

## 2018-01-01 NOTE — PT/OT/SLP PROGRESS
"Speech Language Pathology Treatment    Patient Name:  Sanam Bocanegra   MRN:  [id number]  Admitting Diagnosis: Septic shock    Recommendations:                 General Recommendations:  Dysphagia therapy and Cognitive-linguistic therapy  Diet recommendations:  Regular, Liquid Diet Level: Thin   Aspiration Precautions: 1 bite/sip at a time, Alternating bites/sips, Assistance with meals, HOB to 90 degrees, Meds whole 1 at a time, Remain upright 30 minutes post meal, Small bites/sips and Standard aspiration precautions   General Precautions: Standard, fall (confusion)  Communication strategies:  provide increased time to answer reorientation is needed    Subjective     Pt seen at bedside for direct dysphagia tx and check cognitive status.   Patient goals: "You are related to me, how are the boys?"     Pain/Comfort:  Pain Rating 1: 3/10  Location 1:  (chest pain when seated upright)    Objective:     Has the patient been evaluated by SLP for swallowing?   Yes  Keep patient NPO? No   Current Respiratory Status: room air      Pt seen on tele floor. SLP did communicate with RN for treatment. RN reports bouts of confusion and needs to be reoriented at times.   Pt greeted SLP and convinced that she is related to treating SLP. Pt appeared appropriate at start of the session as noted by being oriented to self and place. As SLP continued to question her, pt began with head tremors and then appeared confused as to why SLP was in room and what she was doing in hospital. Pt had difficulty being reoriented and then reported chest pain. RN called back to room to re-assess status.   Pt did agree to po trials for diet upgrade. Pt given water, applesauce and cracker.   Pt with fair oral motor coordination and timing of swallow. Pt did not hold bolus and able to take liquid rinse in between bites. Pt reported applesauce was too cold for her. SHe did accept and swallow at least 3 bites. SHe requested cracker and stated "it will taste better. " ""  No audible coughing or outward signs of dysphagia to thin liquids by cup or straw trials.   Pt did responded to temporal orientation questions appropriately w/ 50% accuracy. Pt had difficulty recalling address and personal info.   Pt also fixated on that she knew treating SLP despite SLP explaining her role. Pt was easily distracted during session.   Will monitor pt with upgrade diet and check cognition via informal assessment.     Assessment:     Sanam Bocanegra is a 67 y.o. female with an SLP diagnosis of improved po status, able to be upgrade to regular tray but demonstrates impaired cognition.       Goals:    SLP Goals        Problem: SLP Goal    Goal Priority Disciplines Outcome   SLP Goal     SLP Ongoing (interventions implemented as appropriate)   Description:  Short Term Goals:  1. Pt will consume % of puree diet with thin liquids without overt s/s of aspiration given mod assist.   2. Pt will implement safe swallowing strategies 100% of the time during meals given mod assist.   3. Pt will consume 2/2 solids without overt s/s of aspiration given mod assist.   4. Pt will answer orientation questions with 100% accuracy given mod assist.                     Plan:     · Patient to be seen:  3 x/week   · Plan of Care expires:  01/28/18  · Plan of Care reviewed with:  patient (RN)   · SLP Follow-Up:  Yes       Discharge recommendations:   (TBD will need assistance at home)   Barriers to Discharge:  Safety Awareness impaired    Time Tracking:     SLP Treatment Date:   01/01/18  Speech Start Time:  1148  Speech Stop Time:  1208     Speech Total Time (min):  20 min    Billable Minutes: Speech Therapy Individual 10 and Treatment Swallowing Dysfunction 10    VIVEK Pérez, CCC-SLP  01/01/2018  "

## 2018-01-01 NOTE — PLAN OF CARE
Report to ingrid Roman      1830 transferred to 423 via bed with myself and pct, belongings with  , insulins with pt

## 2018-01-02 LAB
ALBUMIN SERPL BCP-MCNC: 2 G/DL
ALBUMIN SERPL ELPH-MCNC: 2.26 G/DL
ALP SERPL-CCNC: 67 U/L
ALPHA1 GLOB SERPL ELPH-MCNC: 0.48 G/DL
ALPHA2 GLOB SERPL ELPH-MCNC: 0.95 G/DL
ALT SERPL W/O P-5'-P-CCNC: 6 U/L
ANA SER QL IF: NORMAL
ANCA AB TITR SER IF: NORMAL TITER
ANION GAP SERPL CALC-SCNC: 10 MMOL/L
ANISOCYTOSIS BLD QL SMEAR: SLIGHT
AST SERPL-CCNC: 9 U/L
B-GLOBULIN SERPL ELPH-MCNC: 0.72 G/DL
BASOPHILS # BLD AUTO: ABNORMAL K/UL
BASOPHILS NFR BLD: 0 %
BILIRUB SERPL-MCNC: 0.3 MG/DL
BUN SERPL-MCNC: 16 MG/DL
CALCIUM SERPL-MCNC: 7.1 MG/DL
CHLORIDE SERPL-SCNC: 105 MMOL/L
CO2 SERPL-SCNC: 30 MMOL/L
CREAT SERPL-MCNC: 1.6 MG/DL
DIFFERENTIAL METHOD: ABNORMAL
EOSINOPHIL # BLD AUTO: ABNORMAL K/UL
EOSINOPHIL NFR BLD: 1 %
ERYTHROCYTE [DISTWIDTH] IN BLOOD BY AUTOMATED COUNT: 14.3 %
EST. GFR  (AFRICAN AMERICAN): 38 ML/MIN/1.73 M^2
EST. GFR  (NON AFRICAN AMERICAN): 33 ML/MIN/1.73 M^2
GAMMA GLOB SERPL ELPH-MCNC: 0.6 G/DL
GLUCOSE SERPL-MCNC: 198 MG/DL
HCT VFR BLD AUTO: 22.4 %
HGB BLD-MCNC: 7.2 G/DL
HYPOCHROMIA BLD QL SMEAR: ABNORMAL
LYMPHOCYTES # BLD AUTO: ABNORMAL K/UL
LYMPHOCYTES NFR BLD: 50 %
MAGNESIUM SERPL-MCNC: 1.3 MG/DL
MCH RBC QN AUTO: 31 PG
MCHC RBC AUTO-ENTMCNC: 32.1 G/DL
MCV RBC AUTO: 97 FL
MONOCYTES # BLD AUTO: ABNORMAL K/UL
MONOCYTES NFR BLD: 9 %
NEUTROPHILS NFR BLD: 33 %
NEUTS BAND NFR BLD MANUAL: 7 %
O+P STL TRI STN: NORMAL
OB PNL STL: NEGATIVE
P-ANCA TITR SER IF: NORMAL TITER
PATHOLOGIST INTERPRETATION SPE: NORMAL
PHOSPHATE SERPL-MCNC: 2.9 MG/DL
PLATELET # BLD AUTO: 54 K/UL
PLATELET BLD QL SMEAR: ABNORMAL
PMV BLD AUTO: 11.5 FL
POCT GLUCOSE: 193 MG/DL (ref 70–110)
POCT GLUCOSE: 213 MG/DL (ref 70–110)
POCT GLUCOSE: 215 MG/DL (ref 70–110)
POCT GLUCOSE: 283 MG/DL (ref 70–110)
POIKILOCYTOSIS BLD QL SMEAR: SLIGHT
POTASSIUM SERPL-SCNC: 3 MMOL/L
PROT SERPL-MCNC: 5 G/DL
PROT SERPL-MCNC: 5.7 G/DL
RBC # BLD AUTO: 2.32 M/UL
SODIUM SERPL-SCNC: 145 MMOL/L
VANCOMYCIN SERPL-MCNC: 2.7 UG/ML
WBC # BLD AUTO: 1.34 K/UL

## 2018-01-02 PROCEDURE — 97530 THERAPEUTIC ACTIVITIES: CPT

## 2018-01-02 PROCEDURE — 84100 ASSAY OF PHOSPHORUS: CPT

## 2018-01-02 PROCEDURE — 80202 ASSAY OF VANCOMYCIN: CPT

## 2018-01-02 PROCEDURE — 63600175 PHARM REV CODE 636 W HCPCS: Performed by: FAMILY MEDICINE

## 2018-01-02 PROCEDURE — 97116 GAIT TRAINING THERAPY: CPT

## 2018-01-02 PROCEDURE — 85007 BL SMEAR W/DIFF WBC COUNT: CPT

## 2018-01-02 PROCEDURE — 25000003 PHARM REV CODE 250: Performed by: FAMILY MEDICINE

## 2018-01-02 PROCEDURE — 97161 PT EVAL LOW COMPLEX 20 MIN: CPT

## 2018-01-02 PROCEDURE — 63600175 PHARM REV CODE 636 W HCPCS: Performed by: INTERNAL MEDICINE

## 2018-01-02 PROCEDURE — 94761 N-INVAS EAR/PLS OXIMETRY MLT: CPT

## 2018-01-02 PROCEDURE — 80053 COMPREHEN METABOLIC PANEL: CPT

## 2018-01-02 PROCEDURE — 83735 ASSAY OF MAGNESIUM: CPT

## 2018-01-02 PROCEDURE — 99233 SBSQ HOSP IP/OBS HIGH 50: CPT | Mod: ,,, | Performed by: INTERNAL MEDICINE

## 2018-01-02 PROCEDURE — 82272 OCCULT BLD FECES 1-3 TESTS: CPT

## 2018-01-02 PROCEDURE — 25000003 PHARM REV CODE 250: Performed by: STUDENT IN AN ORGANIZED HEALTH CARE EDUCATION/TRAINING PROGRAM

## 2018-01-02 PROCEDURE — 97535 SELF CARE MNGMENT TRAINING: CPT

## 2018-01-02 PROCEDURE — G8978 MOBILITY CURRENT STATUS: HCPCS | Mod: CK

## 2018-01-02 PROCEDURE — 85027 COMPLETE CBC AUTOMATED: CPT

## 2018-01-02 PROCEDURE — G8980 MOBILITY D/C STATUS: HCPCS | Mod: CJ

## 2018-01-02 PROCEDURE — 63600175 PHARM REV CODE 636 W HCPCS: Performed by: STUDENT IN AN ORGANIZED HEALTH CARE EDUCATION/TRAINING PROGRAM

## 2018-01-02 PROCEDURE — 11000001 HC ACUTE MED/SURG PRIVATE ROOM

## 2018-01-02 PROCEDURE — 25000003 PHARM REV CODE 250

## 2018-01-02 RX ORDER — POTASSIUM CHLORIDE 7.45 MG/ML
10 INJECTION INTRAVENOUS
Status: COMPLETED | OUTPATIENT
Start: 2018-01-02 | End: 2018-01-02

## 2018-01-02 RX ORDER — L. ACIDOPHILUS/L.BULGARICUS 100MM CELL
1 GRANULES IN PACKET (EA) ORAL 2 TIMES DAILY
Status: DISCONTINUED | OUTPATIENT
Start: 2018-01-02 | End: 2018-01-03 | Stop reason: HOSPADM

## 2018-01-02 RX ORDER — MAGNESIUM SULFATE HEPTAHYDRATE 40 MG/ML
2 INJECTION, SOLUTION INTRAVENOUS ONCE
Status: COMPLETED | OUTPATIENT
Start: 2018-01-02 | End: 2018-01-02

## 2018-01-02 RX ADMIN — PROMETHAZINE HYDROCHLORIDE 6.25 MG: 25 INJECTION INTRAMUSCULAR; INTRAVENOUS at 11:01

## 2018-01-02 RX ADMIN — CEFEPIME 1 G: 1 INJECTION, POWDER, FOR SOLUTION INTRAMUSCULAR; INTRAVENOUS at 09:01

## 2018-01-02 RX ADMIN — POTASSIUM CHLORIDE 10 MEQ: 10 INJECTION, SOLUTION INTRAVENOUS at 11:01

## 2018-01-02 RX ADMIN — INSULIN ASPART 4 UNITS: 100 INJECTION, SOLUTION INTRAVENOUS; SUBCUTANEOUS at 04:01

## 2018-01-02 RX ADMIN — LACTOBACILLUS ACIDOPHILUS / LACTOBACILLUS BULGARICUS 1 EACH: 100 MILLION CFU STRENGTH GRANULES at 08:01

## 2018-01-02 RX ADMIN — MAGNESIUM SULFATE IN WATER 2 G: 40 INJECTION, SOLUTION INTRAVENOUS at 06:01

## 2018-01-02 RX ADMIN — ONDANSETRON 4 MG: 2 INJECTION INTRAMUSCULAR; INTRAVENOUS at 06:01

## 2018-01-02 RX ADMIN — POTASSIUM CHLORIDE 10 MEQ: 10 INJECTION, SOLUTION INTRAVENOUS at 09:01

## 2018-01-02 RX ADMIN — INSULIN ASPART 6 UNITS: 100 INJECTION, SOLUTION INTRAVENOUS; SUBCUTANEOUS at 12:01

## 2018-01-02 RX ADMIN — INSULIN ASPART 2 UNITS: 100 INJECTION, SOLUTION INTRAVENOUS; SUBCUTANEOUS at 09:01

## 2018-01-02 RX ADMIN — PANTOPRAZOLE SODIUM 40 MG: 40 TABLET, DELAYED RELEASE ORAL at 09:01

## 2018-01-02 RX ADMIN — AZITHROMYCIN 500 MG: 250 TABLET, FILM COATED ORAL at 09:01

## 2018-01-02 RX ADMIN — PANTOPRAZOLE SODIUM 40 MG: 40 TABLET, DELAYED RELEASE ORAL at 08:01

## 2018-01-02 RX ADMIN — CIPROFLOXACIN 400 MG: 2 INJECTION, SOLUTION INTRAVENOUS at 11:01

## 2018-01-02 RX ADMIN — INSULIN ASPART 2 UNITS: 100 INJECTION, SOLUTION INTRAVENOUS; SUBCUTANEOUS at 08:01

## 2018-01-02 RX ADMIN — MAGNESIUM SULFATE HEPTAHYDRATE 3 G: 500 INJECTION, SOLUTION INTRAMUSCULAR; INTRAVENOUS at 09:01

## 2018-01-02 RX ADMIN — POTASSIUM CHLORIDE 10 MEQ: 10 INJECTION, SOLUTION INTRAVENOUS at 10:01

## 2018-01-02 NOTE — PLAN OF CARE
Problem: Physical Therapy Goal  Goal: Physical Therapy Goal  Goals to be met by: 18     Patient will increase functional independence with mobility by performin) Sit <>stand with Mod I with RW.  2) Ambulate 150 feet mod I with RW.  3) Perform LE therex X10 independently.       Outcome: Ongoing (interventions implemented as appropriate)  Initial evaluation complete PT to follow.

## 2018-01-02 NOTE — PROGRESS NOTES
Progress Note  [facility]    Admit Date: 12/29/2017   LOS: 4 days     SUBJECTIVE:     Follow-up For:  NARGIS on CKD    Review of Systems:  She feels better this afternoon  BP control    > 718  ml/24 hrs    OBJECTIVE:     Vital Signs (Most Recent)  Temp: 98.2 °F (36.8 °C) (01/02/18 1606)  Pulse: 106 (01/02/18 1606)  Resp: 18 (01/02/18 1606)  BP: 116/61 (01/02/18 1606)  SpO2: (!) 94 % (01/02/18 1240)    Vital Signs Range (Last 24H):  Temp:  [96.6 °F (35.9 °C)-98.8 °F (37.1 °C)]   Pulse:  []   Resp:  [17-20]   BP: (116-148)/(58-69)   SpO2:  [93 %-94 %]     I & O (Last 24H):    Intake/Output Summary (Last 24 hours) at 01/02/18 1638  Last data filed at 01/02/18 1157   Gross per 24 hour   Intake              600 ml   Output              718 ml   Net             -118 ml     Physical Exam:  Poorly groomed   NAD  No JVD  No murmurs or gallops  Lungs decrease soft +BS  Soft BS + tenderness on palpation  Ext: no edema + petechia     Laboratory:  CBC:    Recent Labs  Lab 01/02/18  0325   WBC 1.34*   RBC 2.32*   HGB 7.2*   HCT 22.4*   PLT 54*     BMP:    Recent Labs  Lab 01/02/18  0325      K 3.0*      CO2 30*   BUN 16   CREATININE 1.6*   CALCIUM 7.1*          ASSESSMENT/PLAN:       A 68 y/o female with PMHx of HTN, HLD, COPD on oxygen, obesity, DM presents to OSH with AMS/encephalopathy and diarrhea. She was diagnosed with stage 3 NARGIS, anemia, thrombocytopenia and campylobacter diarrhea.       1- stage 3 NARGIS etiology is unclear but differential diagnosis should include   -- TTP/HUS secondary to campylobacter colitis  -- ATN d/t hypotension  -- AIN drug induced Levaquin vs infection   - renal function getting better cr 1.6     2- severe metabolic acidosis completed 24 hr bicarb infusion     3- encephalopathy has resolved    4- CKD d/t multiple myeloma was on decadron     5- Pancytopenia      6- hypomagnesemia   -- 3 gm mg done   -- 2 gm mg     7- hypokalemia    Plan:      -- recovery phase ATN     --  monitor UOP   - MG 2 gm iv   -- gif  potassium > 3.5         John Lopez MD  [facility]

## 2018-01-02 NOTE — PLAN OF CARE
Problem: Patient Care Overview  Goal: Plan of Care Review  Outcome: Revised  Pt in bed with  at the bedside. Denies any pain, discomfort, or shortness of breath at this time. 02 sat 93-94% on RA. Pt able to turn self from side to side. Pt assisted with turning. Pt remains on fall precautions. Bed alarm on. Call bell in reach. Pt remains on Cefepime and Cipro IVPB every 24 hours and Azithromycin PO daily. Pt also remains on tele, running SR, HR 60s. No ectopy noted. Will cont to monitor and will report off to oncoming nurse.

## 2018-01-02 NOTE — PT/OT/SLP PROGRESS
Speech Language Pathology  Missed Visit    Sanam Bocanegra  MRN: [id number]    Patient not seen today secondary to pt RAYA for CT/MRI. SLP den follow-up next date.    ILIANA Newman., -SLP  Speech-Language Pathologist   1/2/2018

## 2018-01-02 NOTE — PT/OT/SLP PROGRESS
Occupational Therapy      Patient Name:  Sanam Bocanegra   MRN:  [id number]    Patient not seen today secondary to refused secondary to diarrhea and vomiting during PT session. Will follow-up at later time.    KIMI Mcfarland  1/2/2018

## 2018-01-02 NOTE — PROGRESS NOTES
[facility]  Hematology/Oncology  Progress Note    Patient Name: Sanam Bocanegra  Admission Date: 12/29/2017  Hospital Length of Stay: 4 days  Code Status: Full Code     Subjective:     HPI:  66 yo female transferred from outside facility for management of uremic encephalopathy, ARF, severe dehydration secondary to Campylobacter infection.    Evaluated by nephrology and dialysis being contemplated.    [facility] consulted for possible TTP and potential need for plasma exchange.    Pt apparently was diagnosed with a form of blood cancer, has been seeing Dr.Michael aRmey in [place] and was at one time treated with decadron 10 pills weekly along with a weekly injection - seems like multiple myeloma. But apparently has been doing well with regards to that.        Interval History: Eating dinner.  Feels better.  No fevers.    Oncology Treatment Plan:   [No treatment plan]    Medications:  Continuous Infusions:  Scheduled Meds:   azithromycin  500 mg Oral Daily    ceFEPime (MAXIPIME) IVPB  1 g Intravenous Daily    insulin aspart  1-10 Units Subcutaneous QID (WM & HS)    insulin detemir  20 Units Subcutaneous BID    lactobacillus acidophilus & bulgar  1 packet Oral BID    magnesium sulfate IVPB  2 g Intravenous Once    pantoprazole  40 mg Oral BID     PRN Meds:dextrose 50%, glucagon (human recombinant), glucose, glucose, ondansetron, promethazine (PHENERGAN) IVPB, sodium chloride 0.9%     Review of Systems   Constitutional: Negative for fever and unexpected weight change.   HENT: Negative for mouth sores and nosebleeds.    Respiratory: Negative for shortness of breath and wheezing.    Cardiovascular: Negative for chest pain and palpitations.   Gastrointestinal: Negative for abdominal pain and vomiting.   Psychiatric/Behavioral: Negative for behavioral problems and confusion.     Objective:     Vital Signs (Most Recent):  Temp: 98.2 °F (36.8 °C) (01/02/18 1606)  Pulse: 106 (01/02/18 1606)  Resp: 18  (01/02/18 1606)  BP: 116/61 (01/02/18 1606)  SpO2: 95 % (01/02/18 1639) Vital Signs (24h Range):  Temp:  [96.6 °F (35.9 °C)-98.8 °F (37.1 °C)] 98.2 °F (36.8 °C)  Pulse:  [] 106  Resp:  [17-20] 18  SpO2:  [93 %-95 %] 95 %  BP: (116-148)/(58-69) 116/61     Weight: 100.6 kg (221 lb 12.5 oz)  Body mass index is 34.74 kg/m².  Body surface area is 2.18 meters squared.      Intake/Output Summary (Last 24 hours) at 01/02/18 1752  Last data filed at 01/02/18 1157   Gross per 24 hour   Intake              600 ml   Output              718 ml   Net             -118 ml       Physical Exam   Constitutional: She is oriented to person, place, and time.  Non-toxic appearance. No distress.   HENT:   Mouth/Throat: No oropharyngeal exudate.   Eyes: No scleral icterus.   Neck: Neck supple. No thyroid mass and no thyromegaly present.   Cardiovascular: Normal rate, regular rhythm, S1 normal and normal heart sounds.    Pulmonary/Chest: Effort normal and breath sounds normal. No accessory muscle usage. No respiratory distress. She has no wheezes. She has no rales.   Abdominal: Soft. She exhibits no ascites and no mass. There is no hepatosplenomegaly. There is no tenderness.   Musculoskeletal: She exhibits no edema.   Lymphadenopathy:     She has no cervical adenopathy.     She has no axillary adenopathy.   Neurological: She is alert and oriented to person, place, and time. She has normal strength. Gait normal.   Skin: No bruising, no petechiae and no rash noted. She is not diaphoretic.   Psychiatric: Her behavior is normal. Cognition and memory are normal.   Vitals reviewed.      Significant Labs:   All pertinent labs from the last 24 hours have been reviewed.    Diagnostic Results:  I have reviewed all pertinent imaging results/findings within the past 24 hours.    Assessment/Plan:     Thrombocytopenia    Even though her cytopenias are worse clinically she looks much better, more alert and her renal function continues to  improve.    No further workup or intervention recommended from a heme standpoint at this time.    Her cytopenias are expected to improve.  Transfuse packed red blood cells if hemoglobin drops below 7 grams per deciliter.    Consider de-escalating antibiotics.    Monitor blood counts.    Her hematologist is Dr. Guanaco Ramey in [place].  Suggest following up with him after hospital discharge.            Yash Lott MD  Hematology/Oncology  [facility]

## 2018-01-02 NOTE — ASSESSMENT & PLAN NOTE
Even though her cytopenias are worse clinically she looks much better, more alert and her renal function continues to improve.    No further workup or intervention recommended from a heme standpoint at this time.    Her cytopenias are expected to improve.  Transfuse packed red blood cells if hemoglobin drops below 7 grams per deciliter.    Consider de-escalating antibiotics.    Monitor blood counts.    Her hematologist is Dr. Guanaco Ramey in [place].  Suggest following up with him after hospital discharge.     Flu shot to be given

## 2018-01-02 NOTE — PT/OT/SLP PROGRESS
Occupational Therapy   Treatment    Name: Sanam Bocanegra  MRN: [id number]  Admitting Diagnosis:  Septic shock       Recommendations:     Discharge Recommendations:  (TBD)  Discharge Equipment Recommendations:  none  Barriers to discharge:  None    Subjective     Communicated with: RN prior to session.  Pain/Comfort:  · Pain Rating 1: 0/10  · Pain Rating Post-Intervention 1: 0/10    Objective:     Patient found with: telemetry, peripheral IV    General Precautions: Standard, aspiration, fall, pureed diet   Orthopedic Precautions:N/A   Braces:       Occupational Performance:    Bed Mobility:    · Patient completed Rolling/Turning to Right with minimum assistance and with side rail  · Patient completed Scooting/Bridging with contact guard assistance and scoot seated to EOB  · Patient completed Supine to Sit with minimum assistance, with side rail and increased effort and vc's for effective technique     Functional Mobility/Transfers:  · Patient completed Sit <> Stand Transfer with contact guard assistance  with  rolling walker   · Patient completed Bed <> Chair Transfer using Stand Pivot technique with contact guard assistance with rolling walker    Activities of Daily Living:  · Grooming: supervision seated  · Toileting: total assistance pt found on bedpan requiring Tot A for cleaning.    Patient left up in chair with all lines intact, call button in reach, RN notified and spouse present    [facility] 6 Click:  [facility] Total Score: 15    Education:    Assessment:     Sanam Bocanegra is a 67 y.o. female with a medical diagnosis of Septic shock.  She presents with decreased overall strength, endurance, balance and [place] and safety with ADL's and fx mobility.   Pt reports feeling better and requested OOB to chair.  Pt required Min A to transfer to chair. Continue OT services to address functional goals, progressing as able.    Rehab Prognosis:  good; patient would benefit from acute skilled OT services to address these  deficits and reach maximum level of function.       Plan:     Patient to be seen 5 x/week to address the above listed problems via self-care/home management, therapeutic activities, therapeutic exercises  · Plan of Care Expires: 02/01/18  · Plan of Care Reviewed with: patient    This Plan of care has been discussed with the patient who was involved in its development and understands and is in agreement with the identified goals and treatment plan    GOALS:    Occupational Therapy Goals        Problem: Occupational Therapy Goal    Goal Priority Disciplines Outcome Interventions   Occupational Therapy Goal     OT, PT/OT Ongoing (interventions implemented as appropriate)    Description:  Goals to be met by: 1/15/2017    Patient will increase functional independence with ADLs by performing:    Grooming while seated with Supervision.  Toileting from bedside commode with Contact Guard Assistance for hygiene and clothing management.   Supine to sit with Modified [place].  Stand pivot transfers with Stand-by Assistance.  Toilet transfer to bedside commode with Stand-by Assistance.  Upper extremity exercise program x10 reps per handout, with supervision.                      Time Tracking:     OT Date of Treatment: 01/02/18  OT Start Time: 1420  OT Stop Time: 1445  OT Total Time (min): 25 min    Billable Minutes:Self Care/Home Management 15  Therapeutic Activity 10    KIMI Mcfarland  1/2/2018

## 2018-01-02 NOTE — PROGRESS NOTES
Progress Note  [facility]  Admit Date: 12/29/2017   LOS: 4 days   SUBJECTIVE:   Follow-up For: Campylobacter diarrhea     Patient seen and examined this AM.  Had two episodes or diarrhea last night and another one this AM.  Has diarrhea while in bed and can't make it to the rest room.  Described as watery.  Per nurse she's had a restring tremor,now worsening and vertigo.      Denies abd pain, loss of leg sensation, difficulty walking or breathing.      ROS  Denies nausea, fever, chills, abd pain  OBJECTIVE:   Vital Signs (Most Recent)  Temp: 97.6 °F (36.4 °C) (01/02/18 0750)  Pulse: 70 (01/02/18 0750)  Resp: 18 (01/02/18 0750)  BP: (!) 148/69 (01/02/18 0750)  SpO2: (!) 93 % (01/02/18 0830)    I & O (Last 24H):  Intake/Output Summary (Last 24 hours) at 01/02/18 0929  Last data filed at 01/02/18 0600   Gross per 24 hour   Intake              580 ml   Output              718 ml   Net             -138 ml     Wt Readings from Last 3 Encounters:   12/31/17 100.6 kg (221 lb 12.5 oz)       Current Diet Order   Procedures    Diet Diabetic 1800 Calories        Physical Exam  GEN: obese female in NAD  HEENT: MMM  CV: RRR  PULM: CTA-B  GI: S/NT/ND hyperactive bowel sounds  EXT: no edema, bilateral pedal pulses 2+  Neuro: bilateral hand tremor with outstretched hand.  Positive for positional vertigo.  No focal neurological deficit appreciated.      Laboratory Data:  CBC    Recent Labs  Lab 12/31/17  0414 12/31/17  1512 01/01/18  0520 01/02/18  0325   WBC 1.70*  --  1.33* 1.34*   RBC 2.32*  --  2.32* 2.32*   HGB 7.3* 7.1* 7.2* 7.2*   HCT 22.9* 22.3* 22.8* 22.4*   PLT 59*  --  56* 54*   MCV 99*  --  98 97   MCH 31.5*  --  31.0 31.0   MCHC 31.9*  --  31.6* 32.1     CMP    Recent Labs  Lab 12/31/17  0414 01/01/18  0520 01/02/18  0325   CALCIUM 6.3* 6.6* 7.1*   PROT 5.4* 5.5* 5.7*   * 145 145   K 3.3* 3.3* 3.0*   CO2 30* 29 30*    105 105   BUN 32* 22 16   CREATININE 2.1* 1.9* 1.6*   ALKPHOS 75 70 67   ALT 6* 7*  6*   AST 10 9* 9*   BILITOT 0.3 0.3 0.3     POCT-Glucose  POCT Glucose   Date Value Ref Range Status   01/02/2018 193 (H) 70 - 110 mg/dL Final   01/01/2018 233 (H) 70 - 110 mg/dL Final   01/01/2018 288 (H) 70 - 110 mg/dL Final   01/01/2018 305 (H) 70 - 110 mg/dL Final   01/01/2018 241 (H) 70 - 110 mg/dL Final   12/31/2017 257 (H) 70 - 110 mg/dL Final   12/31/2017 274 (H) 70 - 110 mg/dL Final   12/31/2017 229 (H) 70 - 110 mg/dL Final   12/31/2017 267 (H) 70 - 110 mg/dL Final   12/31/2017 349 (H) 70 - 110 mg/dL Final   12/30/2017 331 (H) 70 - 110 mg/dL Final   12/30/2017 208 (H) 70 - 110 mg/dL Final   12/30/2017 288 (H) 70 - 110 mg/dL Final   12/30/2017 334 (H) 70 - 110 mg/dL Final     COAGS    Recent Labs  Lab 12/29/17  1003   INR 1.1   APTT 25.2     UA  No results for input(s): COLORU, CLARITYU, SPECGRAV, PHUR, PROTEINUA, GLUCOSEU, BLOODU, WBCU, RBCU, BACTERIA, MUCUS in the last 24 hours.    Invalid input(s):  BILIRUBINCON  MICRO  Microbiology Results (last 7 days)     Procedure Component Value Units Date/Time    Blood culture [[id number]] Collected:  12/29/17 0438    Order Status:  Completed Specimen:  Blood Updated:  01/02/18 0812     Blood Culture, Routine No Growth to date     Blood Culture, Routine No Growth to date     Blood Culture, Routine No Growth to date     Blood Culture, Routine No Growth to date     Blood Culture, Routine No Growth to date    Narrative:       Site # 1    Blood culture [[id number]] Collected:  12/29/17 0453    Order Status:  Completed Specimen:  Blood Updated:  01/02/18 0812     Blood Culture, Routine No Growth to date     Blood Culture, Routine No Growth to date     Blood Culture, Routine No Growth to date     Blood Culture, Routine No Growth to date     Blood Culture, Routine No Growth to date    Narrative:       Site #2    Stool culture [[id number]] Collected:  12/30/17 1420    Order Status:  Completed Specimen:  Stool from Stool Updated:  01/01/18 0934     Stool Culture No enteric  rosi    Clostridium difficile EIA [[id number]] Collected:  12/30/17 1420    Order Status:  Completed Specimen:  Stool from Stool Updated:  12/30/17 2216     C. diff Antigen Negative     C difficile Toxins A+B, EIA Negative     Comment: Testing not recommended for children <24 months old.       E. coli 0157 antigen [[id number]] Collected:  12/30/17 1420    Order Status:  No result Specimen:  Stool from Stool Updated:  12/30/17 1920    Urine culture [[id number]] Collected:  12/29/17 0239    Order Status:  Completed Specimen:  Urine from Urine, Catheterized Updated:  12/30/17 0950     Urine Culture, Routine No growth        LIPID PANEL  No results found for: CHOL  No results found for: HDL  No results found for: LDLCALC  No results found for: TRIG  No results found for: CHOLHDL    Diagnostic Results:  Imaging in last 24 hours:  Reviewed   ASSESSMENT/PLAN:   68 y/o white female with PMH of HTN, HLD, CAD, COPD (home O2 2L PRN), DM2, anxiety with depression presented to outside hospital for AMS found with SIRS and diarrhea 2/2 campylobacter.      H/o campylobacter diarrhea  -continues to have diarrhea, no sign of [place]   -currently on PO azithromycin, cefepime and cipro, will consider deescalating abx since she's been afebrile  -received levaquin at outside hospital  -initially septic requiring pressors, now BP controlled without pressors  -initial pro-wendi was 1.85  -now afebrile without SIRS criteria   -low platelets 56, no sign of acute bleeding does have a h/o leukemia  -Was evaluated for possible TTP/HUS by heme/onc but negative for TTP/HUS    AMS  -back to baseline  -possibly secondary septic shock since she required pressors, but off pressors now      Acute renal failure  -improved BUN/Cr 16/1.6 today  -baseline 20/0.8  -already received IVF's   -possibly ATN due to ciprofloxacin/levaquin vs septic shock.  Was positive for granular casts on urine      Nutropenia  -WBC 1.3 today and ANC greater than 500  -has a  "h/o leukemia  -will consider consult heme/onc for now       Anemia  -has a h/o leukemia, looks more like pancytopenia  -h/h 7.2/22.8     Tremor  -this appears to be an old problem  -now associated with vertigo but she reports she has a h/o vertigo and has seen a "specialist in [place]"  -will give zofran and phrenergan PRN  -consider consult neurology, appreciate recs    Diet: renal  Ppx: SCD     Dispo: monitor renal failure, diarrhea: consider deescalating abx with caution due to leukopenia        1/2/2018 Matthew To MD  9:29 AM      "

## 2018-01-02 NOTE — PLAN OF CARE
TN went to meet with patient, no family present at bedside.  TN reviewed progress notes. Therapy recommending HH on discharge. Patient is requesting [facility] for discharge (was with them previously). Her PCP is Trina Donnelly in [place], [place].    TN called [facility] and verified information (patient was with them in 2016). TN will sent them HH orders when they are in.     [facility]    [address] [facility] [address]    Ph: [phone]  Fax: [phone]     Follow-up With  Details  Why  Contact Info   [facility]      [address]  [place] [address]  [phone]   Trina Donnelly, NP      [address] [place] [address]  [phone]      01/02/18 1401   Discharge Reassessment   Assessment Type Discharge Planning Reassessment   Provided patient/caregiver education on the expected discharge date and the discharge plan No   Discharge Plan A Home with family;Home Health   Discharge Plan B Home with family   Patient choice form signed by patient/caregiver N/A   Can the patient answer the patient profile reliably? Yes, cognitively intact   How does the patient rate their overall health at the present time? Fair   Describe the patient's ability to walk at the present time. Walks with the help of equipment   How often would a person be available to care for the patient? Often     Trupti Alonso RN  Transition Navigator  [phone]

## 2018-01-02 NOTE — PLAN OF CARE
Problem: Occupational Therapy Goal  Goal: Occupational Therapy Goal  Goals to be met by: 1/15/2017    Patient will increase functional independence with ADLs by performing:    Grooming while seated with Supervision.  Toileting from bedside commode with Contact Guard Assistance for hygiene and clothing management.   Supine to sit with Modified [place].  Stand pivot transfers with Stand-by Assistance.  Toilet transfer to bedside commode with Stand-by Assistance.  Upper extremity exercise program x10 reps per handout, with supervision.     Outcome: Ongoing (interventions implemented as appropriate)  Sanam Bocanegra is a 67 y.o. female with a medical diagnosis of Septic shock.  She presents with decreased overall strength, endurance, balance and [place] and safety with ADL's and fx mobility. Pt reports feeling better this pm and requested OOB to chair.  Pt required Min A to transfer to chair. Continue OT services to address functional goals, progressing as able.    RIN Mcfarland/L

## 2018-01-02 NOTE — PLAN OF CARE
Problem: Patient Care Overview  Goal: Plan of Care Review  Pt received on RA , no respriatory distress noted. Will cont to monitor.

## 2018-01-02 NOTE — SUBJECTIVE & OBJECTIVE
Interval History: Eating dinner.  Feels better.  No fevers.    Oncology Treatment Plan:   [No treatment plan]    Medications:  Continuous Infusions:  Scheduled Meds:   azithromycin  500 mg Oral Daily    ceFEPime (MAXIPIME) IVPB  1 g Intravenous Daily    insulin aspart  1-10 Units Subcutaneous QID (WM & HS)    insulin detemir  20 Units Subcutaneous BID    lactobacillus acidophilus & bulgar  1 packet Oral BID    magnesium sulfate IVPB  2 g Intravenous Once    pantoprazole  40 mg Oral BID     PRN Meds:dextrose 50%, glucagon (human recombinant), glucose, glucose, ondansetron, promethazine (PHENERGAN) IVPB, sodium chloride 0.9%     Review of Systems   Constitutional: Negative for fever and unexpected weight change.   HENT: Negative for mouth sores and nosebleeds.    Respiratory: Negative for shortness of breath and wheezing.    Cardiovascular: Negative for chest pain and palpitations.   Gastrointestinal: Negative for abdominal pain and vomiting.   Psychiatric/Behavioral: Negative for behavioral problems and confusion.     Objective:     Vital Signs (Most Recent):  Temp: 98.2 °F (36.8 °C) (01/02/18 1606)  Pulse: 106 (01/02/18 1606)  Resp: 18 (01/02/18 1606)  BP: 116/61 (01/02/18 1606)  SpO2: 95 % (01/02/18 1639) Vital Signs (24h Range):  Temp:  [96.6 °F (35.9 °C)-98.8 °F (37.1 °C)] 98.2 °F (36.8 °C)  Pulse:  [] 106  Resp:  [17-20] 18  SpO2:  [93 %-95 %] 95 %  BP: (116-148)/(58-69) 116/61     Weight: 100.6 kg (221 lb 12.5 oz)  Body mass index is 34.74 kg/m².  Body surface area is 2.18 meters squared.      Intake/Output Summary (Last 24 hours) at 01/02/18 1752  Last data filed at 01/02/18 1157   Gross per 24 hour   Intake              600 ml   Output              718 ml   Net             -118 ml       Physical Exam   Constitutional: She is oriented to person, place, and time.  Non-toxic appearance. No distress.   HENT:   Mouth/Throat: No oropharyngeal exudate.   Eyes: No scleral icterus.   Neck: Neck supple. No  thyroid mass and no thyromegaly present.   Cardiovascular: Normal rate, regular rhythm, S1 normal and normal heart sounds.    Pulmonary/Chest: Effort normal and breath sounds normal. No accessory muscle usage. No respiratory distress. She has no wheezes. She has no rales.   Abdominal: Soft. She exhibits no ascites and no mass. There is no hepatosplenomegaly. There is no tenderness.   Musculoskeletal: She exhibits no edema.   Lymphadenopathy:     She has no cervical adenopathy.     She has no axillary adenopathy.   Neurological: She is alert and oriented to person, place, and time. She has normal strength. Gait normal.   Skin: No bruising, no petechiae and no rash noted. She is not diaphoretic.   Psychiatric: Her behavior is normal. Cognition and memory are normal.   Vitals reviewed.      Significant Labs:   All pertinent labs from the last 24 hours have been reviewed.    Diagnostic Results:  I have reviewed all pertinent imaging results/findings within the past 24 hours.

## 2018-01-02 NOTE — PT/OT/SLP EVAL
"Physical Therapy Evaluation/Treatment     Patient Name:  Sanam Bocanegra   MRN:  [id number]    Recommendations:     Discharge Recommendations:   (TBD likely HHPT)   Discharge Equipment Recommendations: none   Barriers to discharge: None    Assessment:     Sanam Bocanegra is a 67 y.o. female admitted with a medical diagnosis of Septic shock.  She presents with the following impairments/functional limitations:  weakness, impaired endurance, impaired sensation, impaired self care skills, impaired balance, gait instability, impaired functional mobilty, impaired skin, decreased lower extremity function. Pt demonstrates decreased tolerance to activity, pt reports "it's hot in here, I can't do anymore right now"    Rehab Prognosis:  Good; patient would benefit from acute skilled PT services to address these deficits and reach maximum level of function.      Recent Surgery: * No surgery found *      Plan:     During this hospitalization, patient to be seen 5 x/week to address the above listed problems via gait training, therapeutic activities, therapeutic exercises  · Plan of Care Expires:  02/02/18   Plan of Care Reviewed with: patient, spouse    Subjective     Communicated with LAMAR Mena prior to session.  Patient found resting supine, easily awakes to verbal cueing upon PT entry to room, agreeable to evaluation.      Chief Complaint: decreased mobility  Patient comments/goals: improved functional mobility.   Pain/Comfort:  · Pain Rating 1: 0/10  · Pain Rating Post-Intervention 2: 0/10    Patients cultural, spiritual, Judaism conflicts given the current situation: None verbalized to PT    Living Environment:  Pt lives with her  in a trailer with a ramp to enter. Reports being Mod I with occasional assist from her  for bathing/dressing and toilet hygiene. She does not drive and leave the house only for her doctors appointments. Her  does the cooking and cleaning around the house.     Prior to " "admission, patient required assistance for dressing, bathing and toilet hygiene.   Patient has the following equipment: bedside commode, hospital bed, grab bar, walker, rolling, shower chair, rollator, wheelchair, oxygen, glucometer.  DME owned (not currently used): none.  Upon discharge, patient will have assistance from .    Objective:     Patient found with: telemetry, SCD, PICC line     General Precautions: Standard, fall, aspiration, pureed diet   Orthopedic Precautions:N/A   Braces: N/A     Exams:  · Cognitive Exam:  Patient is oriented to Person, Place, Time and Situation.  · Gross Motor Coordination:  WFL  · Skin Integrity/Edema:      · -       Pt has scabbed wounds on B patella, pt is unaware where/when these came from.   · RLE ROM: WFL  · RLE Strength: at least 4-/5  · LLE ROM: WFL  · LLE Strength: at least 4-/5    Functional Mobility:  · Bed Mobility:     · Rolling Left:  stand by assistance  · Rolling Right: stand by assistance  · Scooting: SBA towards HOB and EOB  · Bridging: stand by assistance  · Supine to Sit: minimum assistance  · Sit to Supine: contact guard assistance  · Transfers:     · Sit to Stand:  minimum assistance with rolling walker  · Gait: 4 side steps towards the left and right with RW and CGA.   · Balance: good static and dynamic sitting balance, fair + static standing balance with RW and fair for dynamic standing balance with Rw.     AM-PAC 6 CLICK MOBILITY  Total Score:15       Therapeutic Activities and Exercises:  -Bed mobility as listed above, pt reported slight dizziness initially upon sitting, improved within 1 minute.   -Sit <>Stand with RW and CGA/Min A.   -Ambulated 4 feet left and right with RW and CGA.   -Pt had one episode of diarrhea.  -Pt attempted toilet hygiene, pt with increased frustration stating "I can't do this" Nursing and PT assisted with hygiene.  -Pt had one episode of dry heaving while sitting EOB, pt reported feeling hot and a cool wet rag was given " to patient.      Patient left HOB elevated with all lines intact, LAMAR Mena notified and  present.    GOALS:    Physical Therapy Goals        Problem: Physical Therapy Goal    Goal Priority Disciplines Outcome Goal Variances Interventions   Physical Therapy Goal     PT/OT, PT Ongoing (interventions implemented as appropriate)     Description:  Goals to be met by: 18     Patient will increase functional independence with mobility by performin) Sit <>stand with Mod I with RW.  2) Ambulate 150 feet mod I with RW.  3) Perform LE therex X10 independently.                         History:     No past medical history on file.    No past surgical history on file.    Clinical Decision Making:     History  Co-morbidities and personal factors that may impact the plan of care Examination  Body Structures and Functions, activity limitations and participation restrictions that may impact the plan of care Clinical Presentation   Decision Making/ Complexity Score   Co-morbidities:   [x] Time since onset of injury / illness / exacerbation  [x] Status of current condition  []Patient's cognitive status and safety concerns    [] Multiple Medical Problems (see med hx)  Personal Factors:   [] Patient's age  [x] Prior Level of function   [] Patient's home situation (environment and family support)  [] Patient's level of motivation  [] Expected progression of patient      HISTORY:(criteria)    [] 29580 - no personal factors/history    [] 74090 - has 1-2 personal factor/comorbidity     [x] 26250 - has >3 personal factor/comorbidity     Body Regions:  [x] Objective examination findings  [] Head     []  Neck  [] Trunk   [x] Upper Extremity  [x] Lower Extremity    Body Systems:  [] For communication ability, affect, cognition, language, and learning style: the assessment of the ability to make needs known, consciousness, orientation (person, place, and time), expected emotional /behavioral responses, and learning  preferences (eg, learning barriers, education  needs)  [] For the neuromuscular system: a general assessment of gross coordinated movement (eg, balance, gait, locomotion, transfers, and transitions) and motor function  (motor control and motor learning)  [x] For the musculoskeletal system: the assessment of gross symmetry, gross range of motion, gross strength, height, and weight  [] For the integumentary system: the assessment of pliability(texture), presence of scar formation, skin color, and skin integrity  [] For cardiovascular/pulmonary system: the assessment of heart rate, respiratory rate, blood pressure, and edema     Activity limitations:    [] Patient's cognitive status and saf ety concerns          [] Status of current condition      [] Weight bearing restriction  [] Cardiopulmunary Restriction    Participation Restrictions:   [] Goals and goal agreement with the patient     [] Rehab potential (prognosis) and probable outcome      Examination of Body System: (criteria)    [] 63415 - addressing 1-2 elements    [x] 79614 - addressing a total of 3 or more elements     [] 63096 -  Addressing a total of 4 or more elements         Clinical Presentation: (criteria)  Stable - 04848     On examination of body system using standardized tests and measures patient presents with 4 or more elements from any of the following: body structures and functions, activity limitations, and/or participation restrictions.  Leading to a clinical presentation that is considered stable and/or uncomplicated                              Clinical Decision Making  (Eval Complexity):  Low- 93745     Time Tracking:     PT Received On: 01/02/18  PT Start Time: 0923     PT Stop Time: 1008  PT Total Time (min): 45 min     Billable Minutes: Evaluation 25, Gait Training 10 and Therapeutic Activity 10      Valentin Henderson PT , DPT  01/02/2018

## 2018-01-03 VITALS
BODY MASS INDEX: 34.81 KG/M2 | HEIGHT: 67 IN | OXYGEN SATURATION: 99 % | SYSTOLIC BLOOD PRESSURE: 138 MMHG | DIASTOLIC BLOOD PRESSURE: 64 MMHG | WEIGHT: 221.81 LBS | TEMPERATURE: 99 F | HEART RATE: 87 BPM | RESPIRATION RATE: 18 BRPM

## 2018-01-03 LAB
ALBUMIN SERPL BCP-MCNC: 2.2 G/DL
ALP SERPL-CCNC: 67 U/L
ALT SERPL W/O P-5'-P-CCNC: 6 U/L
ANION GAP SERPL CALC-SCNC: 8 MMOL/L
ANISOCYTOSIS BLD QL SMEAR: SLIGHT
AST SERPL-CCNC: 11 U/L
BACTERIA BLD CULT: NORMAL
BACTERIA BLD CULT: NORMAL
BACTERIA STL CULT: NORMAL
BACTERIA STL CULT: NORMAL
BASOPHILS # BLD AUTO: ABNORMAL K/UL
BASOPHILS NFR BLD: 0 %
BILIRUB SERPL-MCNC: 0.4 MG/DL
BUN SERPL-MCNC: 14 MG/DL
CALCIUM SERPL-MCNC: 7.5 MG/DL
CERULOPLASMIN SERPL-MCNC: 22 MG/DL
CHLORIDE SERPL-SCNC: 106 MMOL/L
CO2 SERPL-SCNC: 32 MMOL/L
CREAT SERPL-MCNC: 1.4 MG/DL
DIFFERENTIAL METHOD: ABNORMAL
EOSINOPHIL # BLD AUTO: ABNORMAL K/UL
EOSINOPHIL NFR BLD: 3 %
ERYTHROCYTE [DISTWIDTH] IN BLOOD BY AUTOMATED COUNT: 14.1 %
EST. GFR  (AFRICAN AMERICAN): 44 ML/MIN/1.73 M^2
EST. GFR  (NON AFRICAN AMERICAN): 38 ML/MIN/1.73 M^2
FERRITIN SERPL-MCNC: 268 NG/ML
GLUCOSE SERPL-MCNC: 165 MG/DL
HCT VFR BLD AUTO: 24.6 %
HGB BLD-MCNC: 8 G/DL
HYPOCHROMIA BLD QL SMEAR: ABNORMAL
INTERPRETATION SERPL IFE-IMP: NORMAL
IRON SERPL-MCNC: 72 UG/DL
LYMPHOCYTES # BLD AUTO: ABNORMAL K/UL
LYMPHOCYTES NFR BLD: 39 %
MAGNESIUM SERPL-MCNC: 1.5 MG/DL
MCH RBC QN AUTO: 31.4 PG
MCHC RBC AUTO-ENTMCNC: 32.5 G/DL
MCV RBC AUTO: 97 FL
MONOCYTES # BLD AUTO: ABNORMAL K/UL
MONOCYTES NFR BLD: 11 %
NEUTROPHILS NFR BLD: 41 %
NEUTS BAND NFR BLD MANUAL: 6 %
PATH REV BLD -IMP: NORMAL
PATHOLOGIST INTERPRETATION IFE: NORMAL
PHOSPHATE SERPL-MCNC: 3 MG/DL
PLATELET # BLD AUTO: 63 K/UL
PLATELET BLD QL SMEAR: ABNORMAL
PMV BLD AUTO: 11.1 FL
POCT GLUCOSE: 141 MG/DL (ref 70–110)
POCT GLUCOSE: 307 MG/DL (ref 70–110)
POTASSIUM SERPL-SCNC: 3 MMOL/L
PROT SERPL-MCNC: 6.1 G/DL
RBC # BLD AUTO: 2.55 M/UL
SATURATED IRON: 32 %
SODIUM SERPL-SCNC: 146 MMOL/L
TOTAL IRON BINDING CAPACITY: 222 UG/DL
TRANSFERRIN SERPL-MCNC: 150 MG/DL
WBC # BLD AUTO: 1.54 K/UL

## 2018-01-03 PROCEDURE — 99232 SBSQ HOSP IP/OBS MODERATE 35: CPT | Mod: ,,, | Performed by: INTERNAL MEDICINE

## 2018-01-03 PROCEDURE — 25000003 PHARM REV CODE 250: Performed by: FAMILY MEDICINE

## 2018-01-03 PROCEDURE — 94761 N-INVAS EAR/PLS OXIMETRY MLT: CPT

## 2018-01-03 PROCEDURE — 83540 ASSAY OF IRON: CPT

## 2018-01-03 PROCEDURE — 63600175 PHARM REV CODE 636 W HCPCS

## 2018-01-03 PROCEDURE — 84100 ASSAY OF PHOSPHORUS: CPT

## 2018-01-03 PROCEDURE — 80053 COMPREHEN METABOLIC PANEL: CPT

## 2018-01-03 PROCEDURE — 25000003 PHARM REV CODE 250

## 2018-01-03 PROCEDURE — 63600175 PHARM REV CODE 636 W HCPCS: Performed by: FAMILY MEDICINE

## 2018-01-03 PROCEDURE — 97530 THERAPEUTIC ACTIVITIES: CPT

## 2018-01-03 PROCEDURE — 97116 GAIT TRAINING THERAPY: CPT

## 2018-01-03 PROCEDURE — 25000003 PHARM REV CODE 250: Performed by: STUDENT IN AN ORGANIZED HEALTH CARE EDUCATION/TRAINING PROGRAM

## 2018-01-03 PROCEDURE — 85027 COMPLETE CBC AUTOMATED: CPT

## 2018-01-03 PROCEDURE — 82390 ASSAY OF CERULOPLASMIN: CPT

## 2018-01-03 PROCEDURE — 82728 ASSAY OF FERRITIN: CPT

## 2018-01-03 PROCEDURE — 83735 ASSAY OF MAGNESIUM: CPT

## 2018-01-03 PROCEDURE — 92526 ORAL FUNCTION THERAPY: CPT

## 2018-01-03 PROCEDURE — 85007 BL SMEAR W/DIFF WBC COUNT: CPT

## 2018-01-03 RX ORDER — PROMETHAZINE HYDROCHLORIDE 12.5 MG/1
12.5 TABLET ORAL EVERY 6 HOURS PRN
Qty: 20 TABLET | Refills: 0 | Status: SHIPPED | OUTPATIENT
Start: 2018-01-03

## 2018-01-03 RX ORDER — L. ACIDOPHILUS/L.BULGARICUS 100MM CELL
2 GRANULES IN PACKET (EA) ORAL 2 TIMES DAILY
Qty: 30 TABLET | Refills: 1 | OUTPATIENT
Start: 2018-01-03 | End: 2018-01-03

## 2018-01-03 RX ORDER — VENLAFAXINE HYDROCHLORIDE 75 MG/1
75 CAPSULE, EXTENDED RELEASE ORAL DAILY
Status: DISCONTINUED | OUTPATIENT
Start: 2018-01-03 | End: 2018-01-03 | Stop reason: HOSPADM

## 2018-01-03 RX ORDER — L. ACIDOPHILUS/L.BULGARICUS 100MM CELL
2 GRANULES IN PACKET (EA) ORAL 2 TIMES DAILY
Qty: 30 TABLET | Refills: 1 | Status: SHIPPED | OUTPATIENT
Start: 2018-01-03

## 2018-01-03 RX ORDER — AZITHROMYCIN 500 MG/1
500 TABLET, FILM COATED ORAL DAILY
Qty: 4 TABLET | Refills: 0 | Status: SHIPPED | OUTPATIENT
Start: 2018-01-04 | End: 2018-01-08

## 2018-01-03 RX ORDER — MAGNESIUM SULFATE HEPTAHYDRATE 40 MG/ML
2 INJECTION, SOLUTION INTRAVENOUS ONCE
Status: COMPLETED | OUTPATIENT
Start: 2018-01-03 | End: 2018-01-03

## 2018-01-03 RX ORDER — AZITHROMYCIN 500 MG/1
500 TABLET, FILM COATED ORAL DAILY
Qty: 4 TABLET | Refills: 0 | OUTPATIENT
Start: 2018-01-04 | End: 2018-01-03

## 2018-01-03 RX ORDER — POTASSIUM CHLORIDE 7.45 MG/ML
10 INJECTION INTRAVENOUS
Status: COMPLETED | OUTPATIENT
Start: 2018-01-03 | End: 2018-01-03

## 2018-01-03 RX ORDER — PROMETHAZINE HYDROCHLORIDE 12.5 MG/1
12.5 TABLET ORAL EVERY 6 HOURS PRN
Qty: 20 TABLET | Refills: 0 | OUTPATIENT
Start: 2018-01-03 | End: 2018-01-03

## 2018-01-03 RX ADMIN — POTASSIUM CHLORIDE 10 MEQ: 10 INJECTION, SOLUTION INTRAVENOUS at 02:01

## 2018-01-03 RX ADMIN — INSULIN ASPART 8 UNITS: 100 INJECTION, SOLUTION INTRAVENOUS; SUBCUTANEOUS at 12:01

## 2018-01-03 RX ADMIN — POTASSIUM CHLORIDE 10 MEQ: 10 INJECTION, SOLUTION INTRAVENOUS at 12:01

## 2018-01-03 RX ADMIN — LACTOBACILLUS ACIDOPHILUS / LACTOBACILLUS BULGARICUS 1 EACH: 100 MILLION CFU STRENGTH GRANULES at 09:01

## 2018-01-03 RX ADMIN — AZITHROMYCIN 500 MG: 250 TABLET, FILM COATED ORAL at 09:01

## 2018-01-03 RX ADMIN — PANTOPRAZOLE SODIUM 40 MG: 40 TABLET, DELAYED RELEASE ORAL at 09:01

## 2018-01-03 RX ADMIN — POTASSIUM CHLORIDE 10 MEQ: 10 INJECTION, SOLUTION INTRAVENOUS at 10:01

## 2018-01-03 RX ADMIN — MAGNESIUM SULFATE IN WATER 2 G: 40 INJECTION, SOLUTION INTRAVENOUS at 10:01

## 2018-01-03 RX ADMIN — VENLAFAXINE HYDROCHLORIDE 75 MG: 75 CAPSULE, EXTENDED RELEASE ORAL at 09:01

## 2018-01-03 RX ADMIN — CEFEPIME 1 G: 1 INJECTION, POWDER, FOR SOLUTION INTRAMUSCULAR; INTRAVENOUS at 09:01

## 2018-01-03 NOTE — PT/OT/SLP PROGRESS
Physical Therapy Treatment    Patient Name:  Sanam Bocanegra   MRN:  [id number]    Recommendations:     Discharge Recommendations:  home health speech therapy, home health OT, home health PT   Discharge Equipment Recommendations: none   Barriers to discharge: None    Assessment:     Sanam Bocanegra is a 67 y.o. female admitted with a medical diagnosis of Septic shock.  She presents with the following impairments/functional limitations:  weakness, impaired endurance, impaired sensation, impaired self care skills, impaired functional mobilty, gait instability, impaired balance, impaired skin, impaired cardiopulmonary response to activity, decreased safety awareness, decreased lower extremity function, decreased upper extremity function.    Rehab Prognosis:  good; patient would benefit from acute skilled PT services to address these deficits and reach maximum level of function.      Recent Surgery: * No surgery found *      Plan:     During this hospitalization, patient to be seen 5 x/week to address the above listed problems via gait training, therapeutic activities, therapeutic exercises  · Plan of Care Expires:  02/02/18   Plan of Care Reviewed with: patient, spouse    Subjective     Communicated with LAMAR Torres prior to session.  Patient found HOB elevated with  in room upon PT entry to room, agreeable to treatment.        Pain/Comfort:  · Pain Rating 1: 0/10  · Pain Rating Post-Intervention 1: 0/10    Patients cultural, spiritual, Rastafari conflicts given the current situation: None verbalized to PT    Objective:     Patient found with: peripheral IV, telemetry     General Precautions: Standard, fall, aspiration   Orthopedic Precautions:N/A   Braces: N/A     Functional Mobility:  · Bed Mobility:     · Rolling Left:  stand by assistance  · Scooting: contact guard assistance  · Supine to Sit: contact guard assistance  · Transfers:     · Sit to Stand:  contact guard assistance with rolling walker  · Bed to Chair:  "contact guard assistance with  rolling walker  using  ambulation   · Gait: 10 feet, then 15 feet.  · Balance: Fair with RW      AM-PAC 6 CLICK MOBILITY  Turning over in bed (including adjusting bedclothes, sheets and blankets)?: 3  Sitting down on and standing up from a chair with arms (e.g., wheelchair, bedside commode, etc.): 3  Moving from lying on back to sitting on the side of the bed?: 3  Moving to and from a bed to a chair (including a wheelchair)?: 3  Need to walk in hospital room?: 3  Climbing 3-5 steps with a railing?: 2  Total Score: 17       Therapeutic Activities and Exercises:  -Bed mobility as listed above  -sit <>stand from bed with CGA.  -ambulated 10 feet to sink.   -pt stood at sink to brush her teeth, demonstrating forward flexion and with forearm on sink for support.   -seated rest break for 3 minutes   -pt ambulated 15 feet to bed side chair.   -pt requested bed pan, prior to sitting. Pt attempted to perform hygiene, pt stated "I cant do this". Pt was dependent for hygiene.     Patient left up in chair with all lines intact, call button in reach, LAMAR Torres notified and .  present..    GOALS:    Physical Therapy Goals        Problem: Physical Therapy Goal    Goal Priority Disciplines Outcome Goal Variances Interventions   Physical Therapy Goal     PT/OT, PT Ongoing (interventions implemented as appropriate)     Description:  Goals to be met by: 18     Patient will increase functional independence with mobility by performin) Sit <>stand with Mod I with RW.  2) Ambulate 150 feet mod I with RW.  3) Perform LE therex X10 independently.                         Time Tracking:     PT Received On: 18  PT Start Time: 1109     PT Stop Time: 1136  PT Total Time (min): 27 min     Billable Minutes: Gait Training 10 and Therapeutic Activity 17    Treatment Type: Treatment  PT/PTA: PT     PTA Visit Number: 0     Valentin Henderson PT, DPT  2018  "

## 2018-01-03 NOTE — PLAN OF CARE
Problem: Patient Care Overview  Goal: Plan of Care Review  Outcome: Outcome(s) achieved Date Met: 01/03/18  Discharge instructions reviewed with patient and spouse. Verbalized understanding. PICC line removed, 41 cm measured, end intact. Telemetry removed and returned to monitor tech. Patient discharged home with family and home health.

## 2018-01-03 NOTE — PROGRESS NOTES
[facility]  Hematology/Oncology  Progress Note    Patient Name: Sanam Bocanegra  Admission Date: 12/29/2017  Hospital Length of Stay: 5 days  Code Status: Full Code     Subjective:     HPI:  66 yo female transferred from outside facility for management of uremic encephalopathy, ARF, severe dehydration secondary to Campylobacter infection.    Evaluated by nephrology and dialysis being contemplated.    [facility] consulted for possible TTP and potential need for plasma exchange.    Pt apparently was diagnosed with a form of blood cancer, has been seeing Dr.Michael Ramey in [place] and was at one time treated with decadron 10 pills weekly along with a weekly injection - seems like multiple myeloma. But apparently has been doing well with regards to that.        Interval History: eating good. Feels better    Oncology Treatment Plan:   [No treatment plan]    Medications:  Continuous Infusions:  Scheduled Meds:   azithromycin  500 mg Oral Daily    ceFEPime (MAXIPIME) IVPB  1 g Intravenous Daily    insulin aspart  1-10 Units Subcutaneous QID (WM & HS)    insulin detemir  20 Units Subcutaneous BID    lactobacillus acidophilus & bulgar  1 packet Oral BID    pantoprazole  40 mg Oral BID    venlafaxine  75 mg Oral Daily     PRN Meds:dextrose 50%, glucagon (human recombinant), glucose, glucose, ondansetron, promethazine (PHENERGAN) IVPB, sodium chloride 0.9%     Review of Systems   Constitutional: Negative for fever and unexpected weight change.   HENT: Negative for mouth sores and nosebleeds.    Respiratory: Negative for shortness of breath and wheezing.    Cardiovascular: Negative for chest pain and palpitations.   Gastrointestinal: Negative for abdominal pain and vomiting.   Psychiatric/Behavioral: Negative for behavioral problems and confusion.     Objective:     Vital Signs (Most Recent):  Temp: 98.6 °F (37 °C) (01/03/18 1155)  Pulse: 87 (01/03/18 1155)  Resp: 18 (01/03/18 1155)  BP: 138/64  (01/03/18 1155)  SpO2: 97 % (01/03/18 1136) Vital Signs (24h Range):  Temp:  [96.3 °F (35.7 °C)-98.6 °F (37 °C)] 98.6 °F (37 °C)  Pulse:  [] 87  Resp:  [18] 18  SpO2:  [92 %-97 %] 97 %  BP: (116-163)/(60-73) 138/64     Weight: 100.6 kg (221 lb 12.5 oz)  Body mass index is 34.74 kg/m².  Body surface area is 2.18 meters squared.      Intake/Output Summary (Last 24 hours) at 01/03/18 1437  Last data filed at 01/03/18 0935   Gross per 24 hour   Intake              405 ml   Output              212 ml   Net              193 ml       Physical Exam   Constitutional: She is oriented to person, place, and time.  Non-toxic appearance. No distress.   HENT:   Mouth/Throat: No oropharyngeal exudate.   Eyes: No scleral icterus.   Neck: Neck supple. No thyroid mass and no thyromegaly present.   Cardiovascular: Normal rate, regular rhythm, S1 normal and normal heart sounds.    Pulmonary/Chest: Effort normal and breath sounds normal. No accessory muscle usage. No respiratory distress. She has no wheezes. She has no rales.   Abdominal: Soft. She exhibits no ascites and no mass. There is no hepatosplenomegaly. There is no tenderness.   Musculoskeletal: She exhibits no edema.   Lymphadenopathy:     She has no cervical adenopathy.     She has no axillary adenopathy.   Neurological: She is alert and oriented to person, place, and time. She has normal strength. Gait normal.   Skin: No bruising, no petechiae and no rash noted. She is not diaphoretic.   Psychiatric: Her behavior is normal. Cognition and memory are normal.   Vitals reviewed.      Significant Labs:   All pertinent labs from the last 24 hours have been reviewed.    Diagnostic Results:  I have reviewed all pertinent imaging results/findings within the past 24 hours.    Assessment/Plan:     Thrombocytopenia    Pancytopenia better.  WBC and platelets better since yesterday.  Hemoglobin up to 8 grams per deciliter.    Creatinine back to baseline.    No further workup or  intervention recommended from a heme standpoint at this time.    F/u with Guanaco Ramey in [place] after hospital discharge.                Yash Lott MD  Hematology/Oncology  [facility]

## 2018-01-03 NOTE — SUBJECTIVE & OBJECTIVE
Interval History: eating good. Feels better    Oncology Treatment Plan:   [No treatment plan]    Medications:  Continuous Infusions:  Scheduled Meds:   azithromycin  500 mg Oral Daily    ceFEPime (MAXIPIME) IVPB  1 g Intravenous Daily    insulin aspart  1-10 Units Subcutaneous QID (WM & HS)    insulin detemir  20 Units Subcutaneous BID    lactobacillus acidophilus & bulgar  1 packet Oral BID    pantoprazole  40 mg Oral BID    venlafaxine  75 mg Oral Daily     PRN Meds:dextrose 50%, glucagon (human recombinant), glucose, glucose, ondansetron, promethazine (PHENERGAN) IVPB, sodium chloride 0.9%     Review of Systems   Constitutional: Negative for fever and unexpected weight change.   HENT: Negative for mouth sores and nosebleeds.    Respiratory: Negative for shortness of breath and wheezing.    Cardiovascular: Negative for chest pain and palpitations.   Gastrointestinal: Negative for abdominal pain and vomiting.   Psychiatric/Behavioral: Negative for behavioral problems and confusion.     Objective:     Vital Signs (Most Recent):  Temp: 98.6 °F (37 °C) (01/03/18 1155)  Pulse: 87 (01/03/18 1155)  Resp: 18 (01/03/18 1155)  BP: 138/64 (01/03/18 1155)  SpO2: 97 % (01/03/18 1136) Vital Signs (24h Range):  Temp:  [96.3 °F (35.7 °C)-98.6 °F (37 °C)] 98.6 °F (37 °C)  Pulse:  [] 87  Resp:  [18] 18  SpO2:  [92 %-97 %] 97 %  BP: (116-163)/(60-73) 138/64     Weight: 100.6 kg (221 lb 12.5 oz)  Body mass index is 34.74 kg/m².  Body surface area is 2.18 meters squared.      Intake/Output Summary (Last 24 hours) at 01/03/18 1437  Last data filed at 01/03/18 0935   Gross per 24 hour   Intake              405 ml   Output              212 ml   Net              193 ml       Physical Exam   Constitutional: She is oriented to person, place, and time.  Non-toxic appearance. No distress.   HENT:   Mouth/Throat: No oropharyngeal exudate.   Eyes: No scleral icterus.   Neck: Neck supple. No thyroid mass and no thyromegaly present.    Cardiovascular: Normal rate, regular rhythm, S1 normal and normal heart sounds.    Pulmonary/Chest: Effort normal and breath sounds normal. No accessory muscle usage. No respiratory distress. She has no wheezes. She has no rales.   Abdominal: Soft. She exhibits no ascites and no mass. There is no hepatosplenomegaly. There is no tenderness.   Musculoskeletal: She exhibits no edema.   Lymphadenopathy:     She has no cervical adenopathy.     She has no axillary adenopathy.   Neurological: She is alert and oriented to person, place, and time. She has normal strength. Gait normal.   Skin: No bruising, no petechiae and no rash noted. She is not diaphoretic.   Psychiatric: Her behavior is normal. Cognition and memory are normal.   Vitals reviewed.      Significant Labs:   All pertinent labs from the last 24 hours have been reviewed.    Diagnostic Results:  I have reviewed all pertinent imaging results/findings within the past 24 hours.

## 2018-01-03 NOTE — PLAN OF CARE
Problem: Physical Therapy Goal  Goal: Physical Therapy Goal  Goals to be met by: 18     Patient will increase functional independence with mobility by performin) Sit <>stand with Mod I with RW.  2) Ambulate 150 feet mod I with RW.  3) Perform LE therex X10 independently.        Outcome: Ongoing (interventions implemented as appropriate)  Improved tolerance for therapy today, pt is progressing towards goals.

## 2018-01-03 NOTE — PLAN OF CARE
Follow-up With  Details  Why  Contact Info   [facility]    [facility]  [address] [place] [address] [address] GUERIN Quyen  [place] [address]  [phone]   Trina Donnelly NP  On 1/10/2018  at 09:30 am, Primary Care Physician---Fax#[phone]  [address] [place] [address] [place] [id number] [phone]   Guanaco Ramey III, MD      [address] [facility] [address]  [phone]     Trupti Alonso RN  Transition Navigator  [phone]

## 2018-01-03 NOTE — PLAN OF CARE
TN spoke with [facility]. They have received orders. They are contacting patient's PCP office, to make sure they will sign orders. She is unsure if a SLP would be available as well. TN informed her patient really needs one, and will have to send it to another company if they do not have one available. She will call TN back.    --Update: TN spoke with Gem at [facility]. She stated they do not have a SLP now. [facility] is the only  company that services that area. They are calling Dr. Donnelly's office (PCP) to see if home health ok, or if she would need outpatient therapy instead since she would be signing the orders. Gem will call TN tomorrow to follow-up. TN also notified Dr. To about the above information and patient/family.    ---TN spoke with patient. She would rather be at home and get therapy. She does not want to go to a facility to receive therapy.    Trupti Alonso RN  Transition Navigator  [phone]

## 2018-01-03 NOTE — PLAN OF CARE
Problem: SLP Goal  Goal: SLP Goal  Short Term Goals:  1. Pt will consume % of puree diet with thin liquids without overt s/s of aspiration given mod assist.   2. Pt will implement safe swallowing strategies 100% of the time during meals given mod assist.   3. Pt will consume 2/2 solids without overt s/s of aspiration given mod assist.   4. Pt will answer orientation questions with 100% accuracy given mod assist.    Outcome: Ongoing (interventions implemented as appropriate)  Pt participated in therapy with cues to sustain attention and frequent redirection cues needed.

## 2018-01-03 NOTE — CONSULTS
"NEUROLOGY FLOOR CONSULT    Reason for consult:  Tremor    Informant:  Patient       Other sources of information : past medical records    CC:  "Tremor"    HPI:   Sanam Bocanegra is a 67 y.o. year old left handed female with PMHx of HTN, HLD, CAD, COPD, DM2, anxiety and depression that was admitted to [facility] medicine due septic shock secondary to Campilobacter now doing much better, neurology was consulted due to action bilateral upper extremities tremor. Patient mentions noticing this tremor for about 2-3 years ago, she mentions tremor will be present only if she is stressed, anxious, or sick, she denies any family history of tremors or parkinson's disease. Patient mentions tremor is bilateral, denies this tremor interferes with he ADLs as it is present only intermittently. Patient denies any dysphagia, postural instability, or rigidity. Patient takes effexor daily for anxiety and depression however she hasn't been taking it since she was hospitalized.     ROS: Pertinent as above.     Histories:     Allergies:  Penicillins    Current Medications:    Current Facility-Administered Medications   Medication Dose Route Frequency Provider Last Rate Last Dose    azithromycin tablet 500 mg  500 mg Oral Daily Virgie Fitzgerald MD   500 mg at 01/02/18 0935    cefepime 1g in dextrose 5% 50 mL IVPB (ready to mix system)  1 g Intravenous Daily Ewelina Oleary  mL/hr at 01/02/18 0940 1 g at 01/02/18 0940    dextrose 50% injection 12.5 g  12.5 g Intravenous PRN Ewelina Oleary MD        glucagon (human recombinant) injection 1 mg  1 mg Intramuscular PRN Ewelina Oleary MD        glucose chewable tablet 16 g  16 g Oral PRN Gordon Schroeder MD        glucose chewable tablet 24 g  24 g Oral PRN Gordon Schroeder MD        insulin aspart pen 1-10 Units  1-10 Units Subcutaneous QID (WM & HS) Ewelina Oleary MD   4 Units at 01/02/18 1658    insulin detemir pen 20 Units  20 Units " Subcutaneous BID Kulwinder Flores MD   20 Units at 01/02/18 0937    lactobacillus acidophilus & bulgar 100 million cell packet 1 each  1 packet Oral BID Matthew To MD        magnesium sulfate 2g in water 50mL IVPB (premix)  2 g Intravenous Once Marcos John Hyde MD   2 g at 01/02/18 1805    ondansetron injection 4 mg  4 mg Intravenous Q6H PRN Virgie Fitzgerald MD   4 mg at 01/02/18 0608    pantoprazole EC tablet 40 mg  40 mg Oral BID Virgie Fitzgerald MD   40 mg at 01/02/18 0935    promethazine (PHENERGAN) 6.25 mg in sodium chloride 0.9% 50 mL IVPB  6.25 mg Intravenous Q6H PRN Roc Luz MD        sodium chloride 0.9% flush 5 mL  5 mL Intravenous PRN Gordon Schroeder MD           Current Evaluation:     Vital Signs:   Vitals:    01/02/18 1606   BP: 116/61   Pulse: 106   Resp: 18   Temp: 98.2 °F (36.8 °C)        Neurological Exam   Mental Status:  Alert and oriented to self, place, time, and situation.      Language:  No aphasia, no dysarthria.     Cranial Nerves:  Visual fields were intact to confrontation, no RAPD, no anisocoria, EOM intact bilaterally, V1-V3 with good sensation to light touch, no facial asymmetry, hearing grossly intact, palate, and tongue midline. Good shoulder rug.     Motor:  Strength: 5/5 in all 4 extremities through out.   Tone: Good muscle tone.    No pronator drift    Movement Exam  Low amplitude low frequency kinetic tremor noted  No bradykinesia  No rigidity  Normal rapid alternating movements.     DTRs:  Symmetric and 2+ through out.     Sensation:  Intact to light touch through out.  Vibration and proprioception intact    Cerebellar:  Negative Romberg   Good finger to nose.  Good heal to shin    Gait and Stand:  Deferred gait and stand      LABORATORY STUDIES:  Recent Results (from the past 24 hour(s))   POCT glucose    Collection Time: 01/01/18  9:03 PM   Result Value Ref Range    POCT Glucose 233 (H) 70 - 110 mg/dL   CBC with Automated Differential     Collection Time: 01/02/18  3:25 AM   Result Value Ref Range    WBC 1.34 (LL) 3.90 - 12.70 K/uL    RBC 2.32 (L) 4.00 - 5.40 M/uL    Hemoglobin 7.2 (L) 12.0 - 16.0 g/dL    Hematocrit 22.4 (L) 37.0 - 48.5 %    MCV 97 82 - 98 fL    MCH 31.0 27.0 - 31.0 pg    MCHC 32.1 32.0 - 36.0 g/dL    RDW 14.3 11.5 - 14.5 %    Platelets 54 (L) 150 - 350 K/uL    MPV 11.5 9.2 - 12.9 fL    Lymph # CANCELED 1.0 - 4.8 K/uL    Mono # CANCELED 0.3 - 1.0 K/uL    Eos # CANCELED 0.0 - 0.5 K/uL    Baso # CANCELED 0.00 - 0.20 K/uL    Gran% 33.0 (L) 38.0 - 73.0 %    Lymph% 50.0 (H) 18.0 - 48.0 %    Mono% 9.0 4.0 - 15.0 %    Eosinophil% 1.0 0.0 - 8.0 %    Basophil% 0.0 0.0 - 1.9 %    Bands 7.0 %    Platelet Estimate Decreased (A)     Aniso Slight     Poik Slight     Hypo Occasional     Differential Method Manual    Comprehensive Metabolic Panel (CMP)    Collection Time: 01/02/18  3:25 AM   Result Value Ref Range    Sodium 145 136 - 145 mmol/L    Potassium 3.0 (L) 3.5 - 5.1 mmol/L    Chloride 105 95 - 110 mmol/L    CO2 30 (H) 23 - 29 mmol/L    Glucose 198 (H) 70 - 110 mg/dL    BUN, Bld 16 8 - 23 mg/dL    Creatinine 1.6 (H) 0.5 - 1.4 mg/dL    Calcium 7.1 (L) 8.7 - 10.5 mg/dL    Total Protein 5.7 (L) 6.0 - 8.4 g/dL    Albumin 2.0 (L) 3.5 - 5.2 g/dL    Total Bilirubin 0.3 0.1 - 1.0 mg/dL    Alkaline Phosphatase 67 55 - 135 U/L    AST 9 (L) 10 - 40 U/L    ALT 6 (L) 10 - 44 U/L    Anion Gap 10 8 - 16 mmol/L    eGFR if African American 38 (A) >60 mL/min/1.73 m^2    eGFR if non African American 33 (A) >60 mL/min/1.73 m^2   Magnesium    Collection Time: 01/02/18  3:25 AM   Result Value Ref Range    Magnesium 1.3 (L) 1.6 - 2.6 mg/dL   Phosphorus    Collection Time: 01/02/18  3:25 AM   Result Value Ref Range    Phosphorus 2.9 2.7 - 4.5 mg/dL   Vancomycin, random    Collection Time: 01/02/18  3:25 AM   Result Value Ref Range    Vancomycin, Random 2.7 Not established ug/mL   POCT glucose    Collection Time: 01/02/18  6:46 AM   Result Value Ref Range    POCT  Glucose 193 (H) 70 - 110 mg/dL   Occult blood x 1, stool    Collection Time: 01/02/18  8:32 AM   Result Value Ref Range    Occult Blood Negative Negative   POCT glucose    Collection Time: 01/02/18 11:42 AM   Result Value Ref Range    POCT Glucose 283 (H) 70 - 110 mg/dL   POCT glucose    Collection Time: 01/02/18  4:05 PM   Result Value Ref Range    POCT Glucose 215 (H) 70 - 110 mg/dL       RADIOLOGY STUDIES:  I have personally reviewed the images performed.     BRAIN MRI:   1.  Postoperative changes of frontal scalp.    2.  Fluid both mastoid air cells.    3.  Generous atrophy anterior cerebellar hemispheres, out of proportion remainder brain.    4.  Numerous  Remote foci gliosis in the subcortical white matter frontal parietal occipital lobes, upper basal ganglia, microvascular ischemic change would be favored in differential diagnosis.      Assessment/Plan:     67 year old female with PMHx of HTN, HLD, CAD, COPD, DM2, anxiety and depression. Admitted due to septic shock due to Campilobacter diarrhea, that has a kinetic tremor which is likely essential tremor that has been exacerbated due to current illness plus being off venlafaxine.     Essential Tremor  - MRI brain without acute abnormalities, did show cerebellar atrophy and small vessel disease.  - Ferritin, Iron panel, and ceruloplasmin pending.   - Patient mentions tremor is not bothersome for the must part.   - Would recommend re-starting Effexor XR as this is likely helping her tremor to be under better controlled.   - Discuss with patient that if tremor worsen on the future she should follow up with neurology as an outpatient to consider starting therapy, for now she agrees on not adding any more medications.     Case discussed with Dr. Jones    Appreciate the consult. Neurology signs off.     NASIR Alba MD  [facility] - Neurology PGY3

## 2018-01-03 NOTE — ASSESSMENT & PLAN NOTE
Pancytopenia better.  WBC and platelets better since yesterday.  Hemoglobin up to 8 grams per deciliter.    Creatinine back to baseline.    No further workup or intervention recommended from a heme standpoint at this time.    F/u with Guanaco Ramey in [place] after hospital discharge.

## 2018-01-03 NOTE — PLAN OF CARE
TN inquired with Dr. Fitzgerald if patient were discharging today. He stated plans to discharge patient today. TN informed him will need HH orders for PT/OT/SLP/SN.     Dr. Friedman and Dr. To stated PCP can refer patient to nephrologist near her home. TN will fax discharge summary when available.    TN faxed HH orders to [facility] (patient's preferenc) via right care. TN called and notified them, fax number verified. They will inform TN if accepted.      TN also called oncologist number, message left for follow-up.--Update: TN gave them patient's information, will call her to schedule.    --TN met with patient and family. Follow-Up information reviewed, verbalized understanding. Family at bedside for transport home.    Follow-up With  Details  Why  Contact Info   [facility]    [facility]  [address] [place] [address] DR GUERIN [address] [place] [id number]  [phone]   Trina Donnelly NP  On 1/10/2018  at 09:30 am, Primary Care Physician---Fax#[phone]  [address] [place] [address]  [phone]   Guanaco Ramey III, MD      [address] [facility] [address]  [phone]      01/04/18 1624   Final Note   Assessment Type Final Discharge Note   Discharge Disposition [facility]   What phone number can be called within the next 1-3 days to see how you are doing after discharge? [phone]   Hospital Follow Up  Appt(s) scheduled? Yes   Discharge plans and expectations educations in teach back method with documentation complete? Yes   Right Care Referral Info   Post Acute Recommendation Home-care   Referral Type Home Health   Facility Name [facility]     Trupti Alonso RN  Transition Navigator  [phone]

## 2018-01-03 NOTE — DISCHARGE SUMMARY
Discharge Summary      Admit Date: 12/29/2017    Discharge Date and Time: 01/03/2018    Attending Physician: Roc Luz MD     Discharge Physician: Matthew To    Principal Diagnoses: Septic shock  The primary encounter diagnosis was Acute renal failure, unspecified acute renal failure type. Diagnoses of Dehydration, Sepsis, due to unspecified organism, Altered mental status, unspecified altered mental status type, Campylobacter diarrhea, ARF (acute renal failure), Hypotension, Altered mental status, Type 2 diabetes mellitus without complication, unspecified long term insulin use status, Septic shock, Chronic obstructive pulmonary disease, unspecified COPD type, Other hyperlipidemia, Other specified hypotension, Metabolic acidosis, and Thrombocytopenia were also pertinent to this visit.    Discharged Condition: stable    Hospital Course: Sanam Bocanegra is a 67 y.o. female with  PMH of leukemia, HTN, HLD, CAD, COPD (home O2 2L PRN), DM2, anxiety with depression presented to outside hospital for AMS x 1 day. Admitted to ICU for AMS and ARF. The following is the hospital course:    66 y/o white female with PMH of HTN, HLD, CAD, COPD (home O2 2L PRN), DM2, anxiety with depression presented to outside hospital for AMS x 1 day which was progressively worsening per daughter-in-law. Patient reported having chronic diarrhea for many years which had recently worsened and noted blood in stool. Found to have SIRS and Campylobacter diarrhea at OSH and initially received Levaquin. She developed ARF with BUN/Cr 87/6.7 on arrival (baseline 20/0.8). CT head negative for acute process, only showing chronic right mastoiditis. Patient was hypotensive on presentation and upon arrival to [facility]. PTA patient received 3L NS and another 1L bolus upon admission, and then continued IVFs at 200 cc/hr. She arrived on Levofed which was continued and titrated prn for MAP >65. She was afebrile on arrival and WBC normal 8.4, however per  chart review pt has chronic pancytopenia, and LA 1.9. BCx's and UC'x obtained at outside hospital. She received Rocephin and Levaquin. Vanc added for coverage of gram positives and MRSA. STAT ABG showing significant metabolic acidosis: pH 7.19, pCO2 38, pO2 111, BE -14, HCO3 14.6, SaO2 97%.     Nephrology consulted for NARGIS and metabolic acidosis. Recommended continue bicarb infusion 3 amps of sodium bicarb at 100 ml/hr. Hematology consulted for thrombocytopenia but did not have concern for TTP/HUS. Neurology consulted for complaint of tremor in bilateral upper extremities. MRI brain without acute abnormalities. Likely to be essential tremor that was exacerbated due to current illness plus being off venlafaxine. Recommended re-starting home Effexor XR.     Symptoms continued to improve over hospital course with return of mental status to baseline. Patient continues to have diarrhea, however no sign of Ter[place]. Continued on PO azithromycin and cefepime. BP is controlled without pressors. She remains afebrile without SIRS criteria. Improved BUN/Cr of 14/1.4   She was instructed to take the PO azithromycin, lactobacillus, phenergan for vertigo, will need a f/u with PCP for vertigo/diarrhea and Heme/onc follow up. Blood cultures were negative for growth.  Stool cultures negative for C diff.         Consults: IP CONSULT TO NEPHROLOGY-[facility]  IP CONSULT TO REGISTERED DIETITIAN/NUTRITIONIST  IP CONSULT TO HEM/ONC  IP CONSULT TO SOCIAL WORK/CASE MANAGEMENT  IP CONSULT TO NEUROLOGY    Significant Diagnostic Studies: as above    Treatments: as above    Disposition: Home or Self Care    Patient Instructions:   Current Discharge Medication List      CONTINUE these medications which have NOT CHANGED    Details   ALPRAZolam (XANAX) 1 MG tablet Take 1 mg by mouth 2 (two) times daily as needed for Anxiety.      aspirin 325 MG tablet Take 325 mg by mouth once daily.      atorvastatin (LIPITOR) 20 MG tablet Take 20 mg by mouth once  daily.      celecoxib (CELEBREX) 100 MG capsule Take 100 mg by mouth 2 (two) times daily.      dexamethasone (DECADRON) 1 MG Tab Take by mouth once a week.      esomeprazole (NEXIUM) 20 MG capsule Take by mouth.      ferrous gluconate (FERGON) 324 MG tablet Take by mouth.      furosemide (LASIX) 20 MG tablet Take by mouth.      gabapentin (NEURONTIN) 100 MG capsule Take by mouth.      lisinopril 10 MG tablet Take by mouth once daily.      meclizine (ANTIVERT) 32 MG tablet Take 32 mg by mouth 3 (three) times daily as needed.      nebivolol (BYSTOLIC) 2.5 MG Tab Take by mouth once daily.      potassium chloride SA (K-DUR,KLOR-CON) 10 MEQ tablet Take by mouth.      SITagliptin (JANUVIA) 100 MG Tab Take by mouth.      valACYclovir (VALTREX) 500 MG tablet Take by mouth 2 (two) times daily.      venlafaxine (EFFEXOR-XR) 150 MG Cp24 Take 75 mg by mouth once daily.             No discharge procedures on file.    Matthew To  01/03/2018  2:31 PM

## 2018-01-03 NOTE — PT/OT/SLP PROGRESS
Occupational Therapy      Patient Name:  Sanam Bocanegra   MRN:  [id number]    Patient not seen today secondary to verbally refused 2/2 recently BTB & impending d/c. Will follow-up as able.    MIKAYLA Dinero  1/3/2018

## 2018-01-03 NOTE — PT/OT/SLP PROGRESS
"Speech Language Pathology Treatment    Patient Name:  Sanam Bocanegra   MRN:  [id number]  Admitting Diagnosis: Septic shock    Recommendations:                 General Recommendations:  Cognitive-linguistic therapy  Diet recommendations:  Regular, Liquid Diet Level: Thin   Aspiration Precautions: follow standard aspiration precautions   General Precautions: Standard, aspiration, fall  Communication strategies:  provide increased time to answer, temporal orientation required    Subjective     Pt seen at bedside for direct dysphagia tx. Family in room and reports no concerns.   Patient goals: "I am tired."     Pain/Comfort:  · Pain Rating 1: 0/10    Objective:     Has the patient been evaluated by SLP for swallowing?   Yes  Keep patient NPO? No   Current Respiratory Status: room air      Pt seen at bedside for tx. Pt alert and required verbal cues and external cues to orient to time, place and reason.   Pt appeared oriented but then continues to have periods of confusion. Pt was reoriented to call box and how to use.   Pt is  Tolerating regular diet with no audible change in voice or coughing. Pt with good mastication of solids.   Pt w/ no oral residue post swallow.   Pt did not recall orientation info or SLP's name after 2 minute delay.  Second signs requested for ST eval. Pt may dc to home but would benefit from  ST for impaired cognition, impaired memory and impaired problem solving.     Assessment:     Sanam Bocanegra is a 67 y.o. female with an SLP diagnosis of baseline swallow mechanism and impaired cognition.       Goals:    SLP Goals        Problem: SLP Goal    Goal Priority Disciplines Outcome   SLP Goal     SLP Ongoing (interventions implemented as appropriate)   Description:  Short Term Goals:  1. Pt will consume % of puree diet with thin liquids without overt s/s of aspiration given mod assist.   2. Pt will implement safe swallowing strategies 100% of the time during meals given mod assist.   3. Pt will " consume 2/2 solids without overt s/s of aspiration given mod assist.   4. Pt will answer orientation questions with 100% accuracy given mod assist.                     Plan:     · Patient to be seen:  3 x/week   · Plan of Care expires:  01/28/18  · Plan of Care reviewed with:  patient (RN)   · SLP Follow-Up:  Yes       Discharge recommendations:  home health speech therapy     Time Tracking:     SLP Treatment Date:   01/03/18  Speech Start Time:  0955  Speech Stop Time:  1005     Speech Total Time (min):  10 min    Billable Minutes: Treatment Swallowing Dysfunction 10    VIVEK Pérez, CCC-SLP  01/03/2018

## 2018-01-03 NOTE — PLAN OF CARE
Problem: Patient Care Overview  Goal: Plan of Care Review  Outcome: Ongoing (interventions implemented as appropriate)  Care plan reviewed with patient.  Patient verbalized understanding.  Patient on continuous tele monitoring, NSR with HR 60-80s.  Patient on room air.  Neuro check Q4H.  Multiple BM last night.  Call lights within reach, bed alarm on, bed in lowest setting.  Staff will continue to monitor.

## 2018-01-03 NOTE — PROGRESS NOTES
Progress Note  [facility]  Admit Date: 12/29/2017   LOS: 5 days   SUBJECTIVE:   Follow-up For: campylobacter diarrhea     Patient seen and examined this AM.  Diarrhea continues for now having 1-2 episodes per day.  She sometimes is not able to make it to the rest room.  Denies abd pain, decreased bilateral leg sensation.      ROS  Denies abd pain, n/v, fever, chills  OBJECTIVE:   Vital Signs (Most Recent)  Temp: 98 °F (36.7 °C) (01/03/18 0810)  Pulse: 72 (01/03/18 0810)  Resp: 18 (01/03/18 0810)  BP: (!) 147/65 (01/03/18 0810)  SpO2: (!) 92 % (01/03/18 0724)    I & O (Last 24H):  Intake/Output Summary (Last 24 hours) at 01/03/18 0923  Last data filed at 01/02/18 2143   Gross per 24 hour   Intake              410 ml   Output              212 ml   Net              198 ml     Wt Readings from Last 3 Encounters:   12/31/17 100.6 kg (221 lb 12.5 oz)       Current Diet Order   Procedures    Diet Diabetic 1800 Calories        Physical Exam  GEN: obese female in NAD  HEENT: MMM  CV: RRR  PULM: CTA-B  GI: S/NT/ND hyperactive bowel sounds  EXT: no edema, bilateral pedal pulses 2+  Neuro: bilateral hand tremor with outstretched hand worse on L hand.  Positive for positional vertigo.  No focal neurological deficit appreciated    Laboratory Data:  CBC    Recent Labs  Lab 01/01/18  0520 01/02/18  0325 01/03/18  0620   WBC 1.33* 1.34* 1.54*   RBC 2.32* 2.32* 2.55*   HGB 7.2* 7.2* 8.0*   HCT 22.8* 22.4* 24.6*   PLT 56* 54* 63*   MCV 98 97 97   MCH 31.0 31.0 31.4*   MCHC 31.6* 32.1 32.5     CMP    Recent Labs  Lab 01/01/18  0520 01/02/18  0325 01/03/18  0620   CALCIUM 6.6* 7.1* 7.5*   PROT 5.5* 5.7* 6.1    145 146*   K 3.3* 3.0* 3.0*   CO2 29 30* 32*    105 106   BUN 22 16 14   CREATININE 1.9* 1.6* 1.4   ALKPHOS 70 67 67   ALT 7* 6* 6*   AST 9* 9* 11   BILITOT 0.3 0.3 0.4     POCT-Glucose  POCT Glucose   Date Value Ref Range Status   01/03/2018 141 (H) 70 - 110 mg/dL Final   01/02/2018 213 (H) 70 - 110 mg/dL  Final   01/02/2018 215 (H) 70 - 110 mg/dL Final   01/02/2018 283 (H) 70 - 110 mg/dL Final   01/02/2018 193 (H) 70 - 110 mg/dL Final   01/01/2018 233 (H) 70 - 110 mg/dL Final   01/01/2018 288 (H) 70 - 110 mg/dL Final   01/01/2018 305 (H) 70 - 110 mg/dL Final   01/01/2018 241 (H) 70 - 110 mg/dL Final   12/31/2017 257 (H) 70 - 110 mg/dL Final   12/31/2017 274 (H) 70 - 110 mg/dL Final   12/31/2017 229 (H) 70 - 110 mg/dL Final     COAGS    Recent Labs  Lab 12/29/17  1003   INR 1.1   APTT 25.2     UA  No results for input(s): COLORU, CLARITYU, SPECGRAV, PHUR, PROTEINUA, GLUCOSEU, BLOODU, WBCU, RBCU, BACTERIA, MUCUS in the last 24 hours.    Invalid input(s):  [employer]  MICRO  Microbiology Results (last 7 days)     Procedure Component Value Units Date/Time    Blood culture [[id number]] Collected:  12/29/17 0438    Order Status:  Completed Specimen:  Blood Updated:  01/03/18 0812     Blood Culture, Routine No growth after 5 days.    Narrative:       Site # 1    Blood culture [[id number]] Collected:  12/29/17 0453    Order Status:  Completed Specimen:  Blood Updated:  01/03/18 0812     Blood Culture, Routine No growth after 5 days.    Narrative:       Site #2    Stool culture [[id number]] Collected:  12/30/17 1420    Order Status:  Completed Specimen:  Stool from Stool Updated:  01/02/18 1113     Stool Culture No enteric rosi    Clostridium difficile EIA [[id number]] Collected:  12/30/17 1420    Order Status:  Completed Specimen:  Stool from Stool Updated:  12/30/17 2216     C. diff Antigen Negative     C difficile Toxins A+B, EIA Negative     Comment: Testing not recommended for children <24 months old.       E. coli 0157 antigen [[id number]] Collected:  12/30/17 1420    Order Status:  No result Specimen:  Stool from Stool Updated:  12/30/17 1920    Urine culture [[id number]] Collected:  12/29/17 0239    Order Status:  Completed Specimen:  Urine from Urine, Catheterized Updated:  12/30/17 1050     Urine Culture, Routine No  "growth        LIPID PANEL  No results found for: CHOL  No results found for: HDL  No results found for: LDLCALC  No results found for: TRIG  No results found for: CHOLHDL    Diagnostic Results:  Imaging in last 24 hours:    ASSESSMENT/PLAN:     66 y/o white female with PMH of HTN, HLD, CAD, COPD (home O2 2L PRN), DM2, anxiety with depression presented to outside hospital for AMS found with SIRS and diarrhea 2/2 campylobacter.      H/o campylobacter diarrhea  -continues to have diarrhea, no sign of Terrazas [place]   -currently on PO azithromycin and cefepime, discontinued cirpro will consider d/c cefepime   -received levaquin at outside hospital  -initially septic requiring pressors, now BP controlled without pressors  -initial pro-wendi was 1.85  -now afebrile without SIRS criteria        AMS  -back to baseline  -possibly secondary septic shock since she required pressors, but off pressors now      Acute renal failure  -much improved   -improved BUN/Cr 14/1.4 today  -baseline 20/0.8  -already received IVF's   -possibly ATN due to ciprofloxacin/levaquin vs septic shock.  Was positive for granular casts on urine      Nutropenia  -WBC 1.34 today and ANC greater than 500  -has a h/o leukemia  -heme/onc consulted, no acute intervention for now  -will need f/u with her hematologist Dr. Guanaco Ramey in [place]    Thrombocytopenia  -low platelets 63, no sign of acute bleeding does have a h/o leukemia  -Was evaluated for possible TTP/HUS by heme/onc but negative for TTP/HUS        Anemia  -has a h/o leukemia, looks more like pancytopenia  -h/h 8/24     Tremor  -this appears to be an old problem  -now associated with vertigo but she reports she has a h/o vertigo and has seen a "specialist in [place]"  -will give zofran and phrenergan PRN  -consider consult neurology, restarted effexor  -MRI negative for any acute process     Diet: renal  Ppx: SCD     Dispo: f/u PT/OT, consider discharge since renal function has much " improved     1/3/2018 Matthew To MD  9:23 AM

## 2018-01-03 NOTE — PROGRESS NOTES
.Pharmacy New Medication Education    Patient accepted medication education.    Pharmacy educated patient on name and purpose of medications and possible side effects, using the teach-back method.     D/C Current Inpatient Medication Orders   D/C azithromycin tablet 500 mg   D/C cefepime 1g in dextrose 5% 50 mL IVPB (ready to mix system)   D/C dextrose 50% injection 12.5 g   D/C glucagon (human recombinant) injection 1 mg   D/C glucose chewable tablet 16 g   D/C glucose chewable tablet 24 g   D/C insulin aspart pen 1-10 Units   D/C insulin detemir pen 20 Units   D/C lactobacillus acidophilus & bulgar 100 million cell packet 1 each   D/C ondansetron injection 4 mg     D/C pantoprazole EC tablet 40 mg   D/C promethazine (PHENERGAN) 6.25 mg in sodium chloride 0.9% 50 mL IVPB   D/C sodium chloride 0.9% flush 5 mL   D/C venlafaxine 24 hr capsule 75 mg       Learners of pharmacy medication education included:  Patient    Patient +/- learner response:  Verbalized Understanding, Teachback

## 2018-01-04 LAB — PROT PATTERN UR ELPH-IMP: NORMAL

## 2018-01-05 LAB — PATHOLOGIST INTERPRETATION UPE: NORMAL

## 2018-01-16 PROBLEM — E78.49 OTHER HYPERLIPIDEMIA: Status: ACTIVE | Noted: 2017-12-29
